# Patient Record
Sex: FEMALE | Race: ASIAN | NOT HISPANIC OR LATINO | Employment: OTHER | URBAN - METROPOLITAN AREA
[De-identification: names, ages, dates, MRNs, and addresses within clinical notes are randomized per-mention and may not be internally consistent; named-entity substitution may affect disease eponyms.]

---

## 2017-02-07 ENCOUNTER — COMMUNICATION - HEALTHEAST (OUTPATIENT)
Dept: FAMILY MEDICINE | Facility: CLINIC | Age: 32
End: 2017-02-07

## 2017-02-27 ENCOUNTER — OFFICE VISIT - HEALTHEAST (OUTPATIENT)
Dept: FAMILY MEDICINE | Facility: CLINIC | Age: 32
End: 2017-02-27

## 2017-02-27 DIAGNOSIS — N91.2 AMENORRHEA: ICD-10-CM

## 2017-02-27 DIAGNOSIS — Z3A.09 9 WEEKS GESTATION OF PREGNANCY: ICD-10-CM

## 2017-02-27 ASSESSMENT — MIFFLIN-ST. JEOR: SCORE: 1076.63

## 2017-03-20 ENCOUNTER — PRENATAL OFFICE VISIT - HEALTHEAST (OUTPATIENT)
Dept: FAMILY MEDICINE | Facility: CLINIC | Age: 32
End: 2017-03-20

## 2017-03-20 DIAGNOSIS — Z34.81 ENCOUNTER FOR SUPERVISION OF OTHER NORMAL PREGNANCY IN FIRST TRIMESTER: ICD-10-CM

## 2017-03-20 LAB — HIV 1+2 AB+HIV1 P24 AG SERPL QL IA: NEGATIVE

## 2017-03-20 ASSESSMENT — MIFFLIN-ST. JEOR: SCORE: 1068.01

## 2017-03-21 ENCOUNTER — COMMUNICATION - HEALTHEAST (OUTPATIENT)
Dept: FAMILY MEDICINE | Facility: CLINIC | Age: 32
End: 2017-03-21

## 2017-03-21 LAB
HBV SURFACE AG SERPL QL IA: NEGATIVE
SYPHILIS RPR SCREEN - HISTORICAL: NORMAL

## 2017-04-24 ENCOUNTER — PRENATAL OFFICE VISIT - HEALTHEAST (OUTPATIENT)
Dept: FAMILY MEDICINE | Facility: CLINIC | Age: 32
End: 2017-04-24

## 2017-04-24 DIAGNOSIS — Z34.82 ENCOUNTER FOR SUPERVISION OF OTHER NORMAL PREGNANCY IN SECOND TRIMESTER: ICD-10-CM

## 2017-04-24 ASSESSMENT — MIFFLIN-ST. JEOR: SCORE: 1085.41

## 2017-04-25 ENCOUNTER — HOSPITAL ENCOUNTER (OUTPATIENT)
Dept: ULTRASOUND IMAGING | Facility: HOSPITAL | Age: 32
Discharge: HOME | End: 2017-04-25
Attending: FAMILY MEDICINE

## 2017-04-25 DIAGNOSIS — Z34.82 ENCOUNTER FOR SUPERVISION OF OTHER NORMAL PREGNANCY IN SECOND TRIMESTER: ICD-10-CM

## 2017-05-02 ENCOUNTER — COMMUNICATION - HEALTHEAST (OUTPATIENT)
Dept: ADMINISTRATIVE | Facility: CLINIC | Age: 32
End: 2017-05-02

## 2017-05-22 ENCOUNTER — PRENATAL OFFICE VISIT - HEALTHEAST (OUTPATIENT)
Dept: FAMILY MEDICINE | Facility: CLINIC | Age: 32
End: 2017-05-22

## 2017-05-22 DIAGNOSIS — Z34.82 ENCOUNTER FOR SUPERVISION OF OTHER NORMAL PREGNANCY IN SECOND TRIMESTER: ICD-10-CM

## 2017-05-22 ASSESSMENT — MIFFLIN-ST. JEOR: SCORE: 1105.37

## 2017-05-24 ENCOUNTER — COMMUNICATION - HEALTHEAST (OUTPATIENT)
Dept: ADMINISTRATIVE | Facility: CLINIC | Age: 32
End: 2017-05-24

## 2017-05-26 ENCOUNTER — COMMUNICATION - HEALTHEAST (OUTPATIENT)
Dept: FAMILY MEDICINE | Facility: CLINIC | Age: 32
End: 2017-05-26

## 2017-05-26 ENCOUNTER — HOSPITAL ENCOUNTER (OUTPATIENT)
Dept: ULTRASOUND IMAGING | Facility: HOSPITAL | Age: 32
Discharge: HOME | End: 2017-05-26
Attending: FAMILY MEDICINE

## 2017-05-26 DIAGNOSIS — Z34.82 ENCOUNTER FOR SUPERVISION OF OTHER NORMAL PREGNANCY IN SECOND TRIMESTER: ICD-10-CM

## 2017-06-21 ENCOUNTER — PRENATAL OFFICE VISIT - HEALTHEAST (OUTPATIENT)
Dept: MIDWIFE SERVICES | Facility: CLINIC | Age: 32
End: 2017-06-21

## 2017-06-21 DIAGNOSIS — Z34.82 ENCOUNTER FOR SUPERVISION OF OTHER NORMAL PREGNANCY, SECOND TRIMESTER: ICD-10-CM

## 2017-06-21 ASSESSMENT — MIFFLIN-ST. JEOR: SCORE: 1145.17

## 2017-07-12 ENCOUNTER — PRENATAL OFFICE VISIT - HEALTHEAST (OUTPATIENT)
Dept: MIDWIFE SERVICES | Facility: CLINIC | Age: 32
End: 2017-07-12

## 2017-07-12 DIAGNOSIS — Z34.82 ENCOUNTER FOR SUPERVISION OF OTHER NORMAL PREGNANCY, SECOND TRIMESTER: ICD-10-CM

## 2017-07-12 ASSESSMENT — MIFFLIN-ST. JEOR: SCORE: 1153.34

## 2017-07-13 LAB — SYPHILIS RPR SCREEN - HISTORICAL: NORMAL

## 2017-07-26 ENCOUNTER — PRENATAL OFFICE VISIT - HEALTHEAST (OUTPATIENT)
Dept: MIDWIFE SERVICES | Facility: CLINIC | Age: 32
End: 2017-07-26

## 2017-07-26 DIAGNOSIS — Z34.83 ENCOUNTER FOR SUPERVISION OF OTHER NORMAL PREGNANCY, THIRD TRIMESTER: ICD-10-CM

## 2017-07-26 ASSESSMENT — MIFFLIN-ST. JEOR: SCORE: 1163.32

## 2017-08-16 ENCOUNTER — PRENATAL OFFICE VISIT - HEALTHEAST (OUTPATIENT)
Dept: MIDWIFE SERVICES | Facility: CLINIC | Age: 32
End: 2017-08-16

## 2017-08-16 DIAGNOSIS — Z34.83 ENCOUNTER FOR SUPERVISION OF OTHER NORMAL PREGNANCY, THIRD TRIMESTER: ICD-10-CM

## 2017-08-16 ASSESSMENT — MIFFLIN-ST. JEOR: SCORE: 1177.38

## 2017-09-06 ENCOUNTER — PRENATAL OFFICE VISIT - HEALTHEAST (OUTPATIENT)
Dept: FAMILY MEDICINE | Facility: CLINIC | Age: 32
End: 2017-09-06

## 2017-09-06 DIAGNOSIS — Z34.90 PREGNANCY: ICD-10-CM

## 2017-09-06 DIAGNOSIS — Z34.83 ENCOUNTER FOR SUPERVISION OF OTHER NORMAL PREGNANCY IN THIRD TRIMESTER: ICD-10-CM

## 2017-09-06 ASSESSMENT — MIFFLIN-ST. JEOR: SCORE: 1199.6

## 2017-09-15 ENCOUNTER — PRENATAL OFFICE VISIT - HEALTHEAST (OUTPATIENT)
Dept: FAMILY MEDICINE | Facility: CLINIC | Age: 32
End: 2017-09-15

## 2017-09-15 DIAGNOSIS — Z34.90 PREGNANCY: ICD-10-CM

## 2017-09-15 DIAGNOSIS — Z34.83 ENCOUNTER FOR SUPERVISION OF OTHER NORMAL PREGNANCY IN THIRD TRIMESTER: ICD-10-CM

## 2017-09-15 ASSESSMENT — MIFFLIN-ST. JEOR: SCORE: 1203.35

## 2017-09-22 ENCOUNTER — PRENATAL OFFICE VISIT - HEALTHEAST (OUTPATIENT)
Dept: FAMILY MEDICINE | Facility: CLINIC | Age: 32
End: 2017-09-22

## 2017-09-22 DIAGNOSIS — Z34.90 PREGNANCY: ICD-10-CM

## 2017-09-22 DIAGNOSIS — Z34.83 ENCOUNTER FOR SUPERVISION OF OTHER NORMAL PREGNANCY IN THIRD TRIMESTER: ICD-10-CM

## 2017-09-22 ASSESSMENT — MIFFLIN-ST. JEOR: SCORE: 1208.79

## 2017-09-29 ENCOUNTER — PRENATAL OFFICE VISIT - HEALTHEAST (OUTPATIENT)
Dept: FAMILY MEDICINE | Facility: CLINIC | Age: 32
End: 2017-09-29

## 2017-09-29 DIAGNOSIS — Z34.83 ENCOUNTER FOR SUPERVISION OF OTHER NORMAL PREGNANCY IN THIRD TRIMESTER: ICD-10-CM

## 2017-09-29 DIAGNOSIS — Z34.90 PREGNANCY: ICD-10-CM

## 2017-09-29 ASSESSMENT — MIFFLIN-ST. JEOR: SCORE: 1211.51

## 2017-12-11 ENCOUNTER — OFFICE VISIT - HEALTHEAST (OUTPATIENT)
Dept: FAMILY MEDICINE | Facility: CLINIC | Age: 32
End: 2017-12-11

## 2017-12-11 DIAGNOSIS — Z30.011 ENCOUNTER FOR INITIAL PRESCRIPTION OF CONTRACEPTIVE PILLS: ICD-10-CM

## 2017-12-11 ASSESSMENT — MIFFLIN-ST. JEOR: SCORE: 1103.1

## 2018-10-11 ENCOUNTER — COMMUNICATION - HEALTHEAST (OUTPATIENT)
Dept: FAMILY MEDICINE | Facility: CLINIC | Age: 33
End: 2018-10-11

## 2018-10-31 ENCOUNTER — OFFICE VISIT - HEALTHEAST (OUTPATIENT)
Dept: FAMILY MEDICINE | Facility: CLINIC | Age: 33
End: 2018-10-31

## 2018-10-31 ENCOUNTER — HOSPITAL ENCOUNTER (OUTPATIENT)
Dept: ULTRASOUND IMAGING | Facility: HOSPITAL | Age: 33
Discharge: HOME | End: 2018-10-31
Attending: FAMILY MEDICINE

## 2018-10-31 DIAGNOSIS — Z3A.10 10 WEEKS GESTATION OF PREGNANCY: ICD-10-CM

## 2018-10-31 DIAGNOSIS — Z23 NEED FOR VACCINATION: ICD-10-CM

## 2018-10-31 LAB — HCG UR QL: POSITIVE

## 2018-10-31 ASSESSMENT — MIFFLIN-ST. JEOR: SCORE: 1101.74

## 2018-11-01 ENCOUNTER — COMMUNICATION - HEALTHEAST (OUTPATIENT)
Dept: FAMILY MEDICINE | Facility: CLINIC | Age: 33
End: 2018-11-01

## 2018-11-07 ENCOUNTER — COMMUNICATION - HEALTHEAST (OUTPATIENT)
Dept: FAMILY MEDICINE | Facility: CLINIC | Age: 33
End: 2018-11-07

## 2018-11-16 ENCOUNTER — PRENATAL OFFICE VISIT - HEALTHEAST (OUTPATIENT)
Dept: FAMILY MEDICINE | Facility: CLINIC | Age: 33
End: 2018-11-16

## 2018-11-16 DIAGNOSIS — Z34.81 ENCOUNTER FOR SUPERVISION OF OTHER NORMAL PREGNANCY IN FIRST TRIMESTER: ICD-10-CM

## 2018-11-16 LAB
ALBUMIN UR-MCNC: NEGATIVE MG/DL
APPEARANCE UR: CLEAR
BASOPHILS # BLD AUTO: 0 THOU/UL (ref 0–0.2)
BASOPHILS NFR BLD AUTO: 0 % (ref 0–2)
BILIRUB UR QL STRIP: NEGATIVE
COLOR UR AUTO: YELLOW
EOSINOPHIL # BLD AUTO: 0.2 THOU/UL (ref 0–0.4)
EOSINOPHIL NFR BLD AUTO: 2 % (ref 0–6)
ERYTHROCYTE [DISTWIDTH] IN BLOOD BY AUTOMATED COUNT: 12.5 % (ref 11–14.5)
GLUCOSE UR STRIP-MCNC: NEGATIVE MG/DL
HCT VFR BLD AUTO: 39.1 % (ref 35–47)
HGB BLD-MCNC: 12.9 G/DL (ref 12–16)
HGB UR QL STRIP: NEGATIVE
HIV 1+2 AB+HIV1 P24 AG SERPL QL IA: NEGATIVE
KETONES UR STRIP-MCNC: NEGATIVE MG/DL
LEUKOCYTE ESTERASE UR QL STRIP: ABNORMAL
LYMPHOCYTES # BLD AUTO: 2.3 THOU/UL (ref 0.8–4.4)
LYMPHOCYTES NFR BLD AUTO: 27 % (ref 20–40)
MCH RBC QN AUTO: 30.9 PG (ref 27–34)
MCHC RBC AUTO-ENTMCNC: 33 G/DL (ref 32–36)
MCV RBC AUTO: 94 FL (ref 80–100)
MONOCYTES # BLD AUTO: 0.6 THOU/UL (ref 0–0.9)
MONOCYTES NFR BLD AUTO: 7 % (ref 2–10)
NEUTROPHILS # BLD AUTO: 5.6 THOU/UL (ref 2–7.7)
NEUTROPHILS NFR BLD AUTO: 64 % (ref 50–70)
NITRATE UR QL: NEGATIVE
PH UR STRIP: 7.5 [PH] (ref 5–8)
PLATELET # BLD AUTO: 237 THOU/UL (ref 140–440)
PMV BLD AUTO: 9.5 FL (ref 8.5–12.5)
RBC # BLD AUTO: 4.17 MILL/UL (ref 3.8–5.4)
SP GR UR STRIP: 1.01 (ref 1–1.03)
UROBILINOGEN UR STRIP-ACNC: ABNORMAL
WBC: 8.8 THOU/UL (ref 4–11)

## 2018-11-16 ASSESSMENT — MIFFLIN-ST. JEOR: SCORE: 1103.55

## 2018-11-17 LAB
ANTIBODY SCREEN: NEGATIVE
BACTERIA SPEC CULT: NO GROWTH
HBV SURFACE AG SERPL QL IA: NEGATIVE
T PALLIDUM AB SER QL: NEGATIVE

## 2018-11-19 LAB
ABO/RH(D): NORMAL
ABORH REPEAT: NORMAL
C TRACH DNA SPEC QL PROBE+SIG AMP: NEGATIVE
N GONORRHOEA DNA SPEC QL NAA+PROBE: NEGATIVE
RUBV IGG SERPL QL IA: POSITIVE

## 2018-11-20 ENCOUNTER — COMMUNICATION - HEALTHEAST (OUTPATIENT)
Dept: FAMILY MEDICINE | Facility: CLINIC | Age: 33
End: 2018-11-20

## 2018-12-17 ENCOUNTER — PRENATAL OFFICE VISIT - HEALTHEAST (OUTPATIENT)
Dept: FAMILY MEDICINE | Facility: CLINIC | Age: 33
End: 2018-12-17

## 2018-12-17 DIAGNOSIS — Z34.82 ENCOUNTER FOR SUPERVISION OF OTHER NORMAL PREGNANCY IN SECOND TRIMESTER: ICD-10-CM

## 2018-12-17 ASSESSMENT — MIFFLIN-ST. JEOR: SCORE: 1096.3

## 2019-01-11 ENCOUNTER — HOSPITAL ENCOUNTER (OUTPATIENT)
Dept: ULTRASOUND IMAGING | Facility: HOSPITAL | Age: 34
Discharge: HOME | End: 2019-01-11
Attending: FAMILY MEDICINE

## 2019-01-11 DIAGNOSIS — Z34.82 ENCOUNTER FOR SUPERVISION OF OTHER NORMAL PREGNANCY IN SECOND TRIMESTER: ICD-10-CM

## 2019-01-14 ENCOUNTER — COMMUNICATION - HEALTHEAST (OUTPATIENT)
Dept: FAMILY MEDICINE | Facility: CLINIC | Age: 34
End: 2019-01-14

## 2019-01-18 ENCOUNTER — PRENATAL OFFICE VISIT - HEALTHEAST (OUTPATIENT)
Dept: FAMILY MEDICINE | Facility: CLINIC | Age: 34
End: 2019-01-18

## 2019-01-18 DIAGNOSIS — Z34.82 ENCOUNTER FOR SUPERVISION OF OTHER NORMAL PREGNANCY IN SECOND TRIMESTER: ICD-10-CM

## 2019-01-18 ASSESSMENT — MIFFLIN-ST. JEOR: SCORE: 1120.79

## 2019-02-15 ENCOUNTER — PRENATAL OFFICE VISIT - HEALTHEAST (OUTPATIENT)
Dept: FAMILY MEDICINE | Facility: CLINIC | Age: 34
End: 2019-02-15

## 2019-02-15 DIAGNOSIS — Z34.82 ENCOUNTER FOR SUPERVISION OF OTHER NORMAL PREGNANCY IN SECOND TRIMESTER: ICD-10-CM

## 2019-02-15 LAB
FASTING STATUS PATIENT QL REPORTED: YES
GLUCOSE 1H P 50 G GLC PO SERPL-MCNC: 93 MG/DL (ref 70–139)
HGB BLD-MCNC: 11.8 G/DL (ref 12–16)

## 2019-02-15 ASSESSMENT — MIFFLIN-ST. JEOR: SCORE: 1141.66

## 2019-02-16 LAB — T PALLIDUM AB SER QL: NEGATIVE

## 2019-03-29 ENCOUNTER — PRENATAL OFFICE VISIT - HEALTHEAST (OUTPATIENT)
Dept: FAMILY MEDICINE | Facility: CLINIC | Age: 34
End: 2019-03-29

## 2019-03-29 DIAGNOSIS — Z34.83 ENCOUNTER FOR SUPERVISION OF OTHER NORMAL PREGNANCY IN THIRD TRIMESTER: ICD-10-CM

## 2019-03-29 ASSESSMENT — MIFFLIN-ST. JEOR: SCORE: 1166.15

## 2019-04-12 ENCOUNTER — PRENATAL OFFICE VISIT - HEALTHEAST (OUTPATIENT)
Dept: FAMILY MEDICINE | Facility: CLINIC | Age: 34
End: 2019-04-12

## 2019-04-12 DIAGNOSIS — Z34.83 ENCOUNTER FOR SUPERVISION OF OTHER NORMAL PREGNANCY IN THIRD TRIMESTER: ICD-10-CM

## 2019-04-12 ASSESSMENT — MIFFLIN-ST. JEOR: SCORE: 1175.68

## 2019-04-26 ENCOUNTER — PRENATAL OFFICE VISIT - HEALTHEAST (OUTPATIENT)
Dept: FAMILY MEDICINE | Facility: CLINIC | Age: 34
End: 2019-04-26

## 2019-04-26 DIAGNOSIS — Z34.83 ENCOUNTER FOR SUPERVISION OF OTHER NORMAL PREGNANCY IN THIRD TRIMESTER: ICD-10-CM

## 2019-04-26 ASSESSMENT — MIFFLIN-ST. JEOR: SCORE: 1180.21

## 2019-05-03 ENCOUNTER — PRENATAL OFFICE VISIT - HEALTHEAST (OUTPATIENT)
Dept: FAMILY MEDICINE | Facility: CLINIC | Age: 34
End: 2019-05-03

## 2019-05-03 DIAGNOSIS — Z34.90 PREGNANCY: ICD-10-CM

## 2019-05-03 DIAGNOSIS — Z34.83 ENCOUNTER FOR SUPERVISION OF OTHER NORMAL PREGNANCY IN THIRD TRIMESTER: ICD-10-CM

## 2019-05-03 DIAGNOSIS — O12.13 PROTEINURIA AFFECTING PREGNANCY IN THIRD TRIMESTER: ICD-10-CM

## 2019-05-03 LAB
ALBUMIN UR-MCNC: ABNORMAL MG/DL
ALT SERPL W P-5'-P-CCNC: 11 U/L (ref 0–45)
AST SERPL W P-5'-P-CCNC: 14 U/L (ref 0–40)
CREAT SERPL-MCNC: 0.56 MG/DL (ref 0.6–1.1)
CREAT UR-MCNC: 117.2 MG/DL
ERYTHROCYTE [DISTWIDTH] IN BLOOD BY AUTOMATED COUNT: 11.5 % (ref 11–14.5)
GFR SERPL CREATININE-BSD FRML MDRD: >60 ML/MIN/1.73M2
GLUCOSE UR STRIP-MCNC: NEGATIVE MG/DL
HCT VFR BLD AUTO: 39.7 % (ref 35–47)
HGB BLD-MCNC: 13 G/DL (ref 12–16)
KETONES UR STRIP-MCNC: ABNORMAL MG/DL
MCH RBC QN AUTO: 30.6 PG (ref 27–34)
MCHC RBC AUTO-ENTMCNC: 32.9 G/DL (ref 32–36)
MCV RBC AUTO: 93 FL (ref 80–100)
PLATELET # BLD AUTO: 172 THOU/UL (ref 140–440)
PMV BLD AUTO: 7.4 FL (ref 7–10)
PROTEIN, RANDOM URINE - HISTORICAL: 18 MG/DL
PROTEIN/CREAT RATIO, RANDOM UR: 0.15
RBC # BLD AUTO: 4.26 MILL/UL (ref 3.8–5.4)
URATE SERPL-MCNC: 4.6 MG/DL (ref 2–7.5)
WBC: 7.6 THOU/UL (ref 4–11)

## 2019-05-03 ASSESSMENT — MIFFLIN-ST. JEOR: SCORE: 1187.47

## 2019-05-05 LAB
ALLERGIC TO PENICILLIN: NO
GP B STREP DNA SPEC QL NAA+PROBE: NEGATIVE

## 2019-05-06 ENCOUNTER — COMMUNICATION - HEALTHEAST (OUTPATIENT)
Dept: FAMILY MEDICINE | Facility: CLINIC | Age: 34
End: 2019-05-06

## 2019-05-10 ENCOUNTER — PRENATAL OFFICE VISIT - HEALTHEAST (OUTPATIENT)
Dept: FAMILY MEDICINE | Facility: CLINIC | Age: 34
End: 2019-05-10

## 2019-05-10 DIAGNOSIS — O26.893 VAGINAL DISCHARGE DURING PREGNANCY IN THIRD TRIMESTER: ICD-10-CM

## 2019-05-10 DIAGNOSIS — Z34.83 ENCOUNTER FOR SUPERVISION OF OTHER NORMAL PREGNANCY IN THIRD TRIMESTER: ICD-10-CM

## 2019-05-10 DIAGNOSIS — N89.8 VAGINAL DISCHARGE DURING PREGNANCY IN THIRD TRIMESTER: ICD-10-CM

## 2019-05-10 DIAGNOSIS — Z34.90 PREGNANCY: ICD-10-CM

## 2019-05-10 LAB
ALBUMIN UR-MCNC: NEGATIVE MG/DL
CLUE CELLS: NORMAL
GLUCOSE UR STRIP-MCNC: NEGATIVE MG/DL
KETONES UR STRIP-MCNC: NEGATIVE MG/DL
TRICHOMONAS, WET PREP: NORMAL
YEAST, WET PREP: NORMAL

## 2019-05-10 ASSESSMENT — MIFFLIN-ST. JEOR: SCORE: 1191.55

## 2019-05-14 ENCOUNTER — HOME CARE/HOSPICE - HEALTHEAST (OUTPATIENT)
Dept: HOME HEALTH SERVICES | Facility: HOME HEALTH | Age: 34
End: 2019-05-14

## 2019-07-22 ENCOUNTER — OFFICE VISIT - HEALTHEAST (OUTPATIENT)
Dept: FAMILY MEDICINE | Facility: CLINIC | Age: 34
End: 2019-07-22

## 2019-07-22 LAB — HGB BLD-MCNC: 13.3 G/DL (ref 12–16)

## 2019-07-23 LAB
HPV SOURCE: NORMAL
HUMAN PAPILLOMA VIRUS 16 DNA: NEGATIVE
HUMAN PAPILLOMA VIRUS 18 DNA: NEGATIVE
HUMAN PAPILLOMA VIRUS FINAL DIAGNOSIS: NORMAL
HUMAN PAPILLOMA VIRUS OTHER HR: NEGATIVE
SPECIMEN DESCRIPTION: NORMAL

## 2019-07-28 ENCOUNTER — COMMUNICATION - HEALTHEAST (OUTPATIENT)
Dept: FAMILY MEDICINE | Facility: CLINIC | Age: 34
End: 2019-07-28

## 2019-07-29 LAB
BKR LAB AP ABNORMAL BLEEDING: NO
BKR LAB AP BIRTH CONTROL/HORMONES: NORMAL
BKR LAB AP CERVICAL APPEARANCE: NORMAL
BKR LAB AP GYN ADEQUACY: NORMAL
BKR LAB AP GYN INTERPRETATION: NORMAL
BKR LAB AP HPV REFLEX: NORMAL
BKR LAB AP LMP: NORMAL
BKR LAB AP PATIENT STATUS: NORMAL
BKR LAB AP PREVIOUS ABNORMAL: NO
BKR LAB AP PREVIOUS NORMAL: 2018
HIGH RISK?: NO
PATH REPORT.COMMENTS IMP SPEC: NORMAL
RESULT FLAG (HE HISTORICAL CONVERSION): NORMAL

## 2020-06-21 ENCOUNTER — COMMUNICATION - HEALTHEAST (OUTPATIENT)
Dept: FAMILY MEDICINE | Facility: CLINIC | Age: 35
End: 2020-06-21

## 2020-10-10 ENCOUNTER — COMMUNICATION - HEALTHEAST (OUTPATIENT)
Dept: FAMILY MEDICINE | Facility: CLINIC | Age: 35
End: 2020-10-10

## 2020-12-21 ENCOUNTER — COMMUNICATION - HEALTHEAST (OUTPATIENT)
Dept: FAMILY MEDICINE | Facility: CLINIC | Age: 35
End: 2020-12-21

## 2020-12-30 ENCOUNTER — COMMUNICATION - HEALTHEAST (OUTPATIENT)
Dept: FAMILY MEDICINE | Facility: CLINIC | Age: 35
End: 2020-12-30

## 2021-01-12 ENCOUNTER — OFFICE VISIT - HEALTHEAST (OUTPATIENT)
Dept: FAMILY MEDICINE | Facility: CLINIC | Age: 36
End: 2021-01-12

## 2021-01-12 DIAGNOSIS — Z30.41 ENCOUNTER FOR SURVEILLANCE OF CONTRACEPTIVE PILLS: ICD-10-CM

## 2021-01-12 RX ORDER — NORGESTIMATE AND ETHINYL ESTRADIOL 7DAYSX3 LO
1 KIT ORAL DAILY
Qty: 84 TABLET | Refills: 3 | Status: SHIPPED | OUTPATIENT
Start: 2021-01-12 | End: 2022-02-14

## 2021-01-12 ASSESSMENT — MIFFLIN-ST. JEOR: SCORE: 1102.19

## 2021-05-27 NOTE — PROGRESS NOTES
Patient reports she continues to feel well.  She denies any leakage of fluid or bleeding, has been feeling good fetal movement.  We discussed kick counting today.  Tdap vaccine given.  [place] paperwork filled [place] today.  Recommended follow-up in 2 weeks with discussion of delivery preferences next visit.

## 2021-05-27 NOTE — PATIENT INSTRUCTIONS - HE
Patient Education   HEALTHY PREGNANCY CARE: 30-34 WEEKS PREGNANT    You have made it to the final months of your pregnancy. By now, your baby is starting to fill out with some fat under his skin, fuzzy hair on his shoulders, and is gaining 4 to 6 ounces per week.    Discuss any travel plans with your midwife or physician.    Review possible changes in sexuality during later pregnancy and discuss these with your midwife or physician, as well as your partner. Alternative love-making positions may be more comfortable.    Discuss labor and delivery issues with your midwife or physician. If you had a  birth in the past, discuss a trial of labor with your midwife or physician. He or she may ask that you sign a consent form, if you wish to have a vaginal birth after  (). Ask your midwife or physician to explain your options for managing pain during your labor and delivery. Sometimes, during the birth process, an episiotomy may need to be cut in the vagina to make the opening bigger or let the baby come out quicker. You may want to discuss the episiotomy and how often it is needed with your midwife or physician.    Plan for your baby's care by selecting a child health care provider (Family physician, Pediatrician, or Pediatric Nurse Practitioner). Practice installing an infant car seat correctly in the car. Ask for car seat information as needed and make sure it is safe and will work in the car your baby will ride in. You will need a car seat to bring your baby home from the hospital. Check the procedure for adding your baby to your health care plan. Review your decision about circumcision and ask for any information you need. As you buy and receive items for your baby, don't put a baby walker on your list. Walkers can be dangerous and can cause serious injury to your child. A safer option is a saucer-type play station, since it doesn't allow baby to travel across the floor.    Discuss your choices and  plans for birth control with your midwife or physician. Women who are breastfeeding can still become pregnant. Use a birth control method if you want to lower your pregnancy risk. Talk to your midwife or physician if you are considering permanent birth control, such as tubal ligation or Essure. You may need to complete a consent form 30 days prior to delivery in order to have this done after you deliver.    Continue to watch for signs and symptoms of preeclampsia:     Sudden swelling of your face, hands, or feet     New vision problems such as blurring, double vision, or flashing lights    A severe headache not relieved with acetaminophen (Tylenol)    Sharp or stabbing pain in your right or middle upper abdomen    Watch for signs and symptoms of premature labor:     Regular contractions. This means having about 6 or more within 1 hour, even after you have had a glass of water and are resting.     A backache that starts and stops regularly.    An increase or change in vaginal discharge, such as heavy, mucus-like, watery, or bloody discharge.     Your water breaks or leaks.    If you have any of the above symptoms or any other concerns, call your provider or their clinic staff at [facility] at Phone: [phone]. If it's after clinic hours, physician patients should call the [facility] at [phone] ([phone]); midwife patients should call their answering service at [phone].    How can you care for yourself at home?   You can refer to the Starting Out Right book or find it online at [email or web address] or [email or web address]/[email or web address]-starting-out-[email or web address]starting-out-right.html#p=8     You can sign up for a weekly parenting e-mail that gives support, tips and advice from health care professionals that starts with pregnancy and continues through the toddler years. To register, go to  [email or web address] at any time during your pregnancy.

## 2021-05-27 NOTE — PATIENT INSTRUCTIONS - HE
Patient Education   HEALTHY PREGNANCY CARE: 30-34 WEEKS PREGNANT    You have made it to the final months of your pregnancy. By now, your baby is starting to fill out with some fat under his skin, fuzzy hair on his shoulders, and is gaining 4 to 6 ounces per week.    Discuss any travel plans with your midwife or physician.    Review possible changes in sexuality during later pregnancy and discuss these with your midwife or physician, as well as your partner. Alternative love-making positions may be more comfortable.    Discuss labor and delivery issues with your midwife or physician. If you had a  birth in the past, discuss a trial of labor with your midwife or physician. He or she may ask that you sign a consent form, if you wish to have a vaginal birth after  (). Ask your midwife or physician to explain your options for managing pain during your labor and delivery. Sometimes, during the birth process, an episiotomy may need to be cut in the vagina to make the opening bigger or let the baby come out quicker. You may want to discuss the episiotomy and how often it is needed with your midwife or physician.    Plan for your baby's care by selecting a child health care provider (Family physician, Pediatrician, or Pediatric Nurse Practitioner). Practice installing an infant car seat correctly in the car. Ask for car seat information as needed and make sure it is safe and will work in the car your baby will ride in. You will need a car seat to bring your baby home from the hospital. Check the procedure for adding your baby to your health care plan. Review your decision about circumcision and ask for any information you need. As you buy and receive items for your baby, don't put a baby walker on your list. Walkers can be dangerous and can cause serious injury to your child. A safer option is a saucer-type play station, since it doesn't allow baby to travel across the floor.    Discuss your choices and  plans for birth control with your midwife or physician. Women who are breastfeeding can still become pregnant. Use a birth control method if you want to lower your pregnancy risk. Talk to your midwife or physician if you are considering permanent birth control, such as tubal ligation or Essure. You may need to complete a consent form 30 days prior to delivery in order to have this done after you deliver.    Continue to watch for signs and symptoms of preeclampsia:     Sudden swelling of your face, hands, or feet     New vision problems such as blurring, double vision, or flashing lights    A severe headache not relieved with acetaminophen (Tylenol)    Sharp or stabbing pain in your right or middle upper abdomen    Watch for signs and symptoms of premature labor:     Regular contractions. This means having about 6 or more within 1 hour, even after you have had a glass of water and are resting.     A backache that starts and stops regularly.    An increase or change in vaginal discharge, such as heavy, mucus-like, watery, or bloody discharge.     Your water breaks or leaks.    If you have any of the above symptoms or any other concerns, call your provider or their clinic staff at [facility] at Phone: [phone]. If it's after clinic hours, physician patients should call the [facility] Connection at [phone] ([phone]); midwife patients should call their answering service at [phone].    How can you care for yourself at home?   You can refer to the Starting Out Right book or find it online at [email or web address]/[email or web address]/[email or web address]-[email or web address]-[email or web address] or [email or web address].[email or web address]/images/stories/flipbooks/[email or web address]-starting-out-right/[facility]-starting-out-right.html#p=8     You can sign up for a weekly parenting e-mail that gives support, tips and advice from health care professionals that starts with pregnancy and continues through the toddler years. To register, go to  [email or web address] at any time during your pregnancy.

## 2021-05-27 NOTE — PROGRESS NOTES
Patient continues to feel well.  She denies leakage of fluid or bleeding, has no lower extremity edema, no contractions.  Reports good fetal movement.  Discussed delivery preferences today.  Patient plans to have baby follow up here after delivery, plans OCP for contraception.  Plan follow-up in 2 weeks.

## 2021-05-28 NOTE — PROGRESS NOTES
Patient feels well from a pregnancy perspective.  She reports good fetal movement, denies contractions or lower extremity edema.  She has had a small amount of vaginal discharge that she describes as white along with some vaginal itching over the past 3 days or so.  Wet prep today is negative, no obvious pooling concerning for amniotic fluid leakage is observed on today's pelvic exam.  She denies any gushes of fluid.  She refuses digital vaginal exam today.  Plan follow-up in 1 week if remains undelivered.

## 2021-05-28 NOTE — PROGRESS NOTES
Patient reports she feels well.  She describes what she thinks were contractions yesterday, although this sounded a bit more prolonged than contractions, possibly Andre Hendrickson.  She denies any leakage of fluid or bleeding.  She did have some protein in her urine today, HELLP labs obtained and normal.  No headaches, no blurred vision, no lower extremity edema.  Group B strep obtained today along with recheck hemoglobin.  Plan follow-up in 1 week.

## 2021-05-28 NOTE — PATIENT INSTRUCTIONS - HE
Patient Education   HEALTHY PREGNANCY CARE: 34-36 WEEKS PREGNANT    Your baby is gaining about an ounce each day, so healthy nutrition and rest are still very important. Learn about late pregnancy symptoms, such as constipation and backaches. Drinking more fluids and eating more fiber can relieve constipation. The pelvic tilt and a heating pad can ease backaches.    Continue to watch for signs and symptoms of preeclampsia:     Sudden swelling of your face, hands, or feet     New vision problems such as blurring, double vision, or flashing lights    A severe headache not relieved with acetaminophen (Tylenol)    Sharp or stabbing pain in your right or middle upper abdomen    You're almost done with your pregnancy but it's still too soon to have your baby. The goal is to have your baby after 37 weeks, so watch for signs and symptoms of premature labor:     Regular contractions. This means having about 6 or more within 1 hour, even after you have had a glass of water and are resting.     A backache that starts and stops regularly.    An increase or change in vaginal discharge, such as heavy, mucus-like, watery, or bloody discharge.     Your water breaks or leaks.    You will be tested for group B streptococcus (GBS), a type of bacteria found in 10-30% of pregnant women. A woman with GBS can pass it to her baby during delivery. Most babies who get GBS from their mothers do not have any problems, but some babies get very ill and need to be hospitalized for antibiotic treatment. Treating you with antibiotics during labor and delivery helps to prevent infection in your baby.    Review information on postpartum care that you will need after the baby is born.  Discuss your choices and plans for birth control with your midwife or physician.     Postpartum Care  During the days and weeks after the delivery of your baby (postpartum period), your body will change as it returns to its nonpregnant condition. As with pregnancy  "changes, postpartum changes are different for every woman.    Physical changes after childbirth  The changes in your body may include:    Contractions called afterpains shrink the uterus for several days after childbirth. Shrinking of the uterus to its prepregnancy size may take 6 to 8 weeks.    Sore muscles (especially in the arms, neck, or jaw) are common after childbirth. This is because of the hard work of labor and pushing your baby out. The soreness should go away in a few days.    Bleeding and vaginal discharge (lochia) may last for 2 to 8 weeks, and can come and go for about 2 months.    Vaginal soreness, including pain, discomfort, and numbness, is common after vaginal birth. Soreness may be worse if you had a perineal tear or episiotomy.    If you had a  birth (), you may have pain in your lower abdomen and may need pain medicine for 1 to 2 weeks.    Breast engorgement is common around the 3rd or 4th day after delivery, when the breasts begin to fill with milk. This can cause discomfort and swelling. If you are breast feeding, the best treatment is to feed your baby often or pump the milk out. You can also use warm compresses. If you are not breast feeding, placing ice packs on your breasts, taking a hot shower, or using warm compresses may relieve the discomfort.    Be aware of postpartum depression    \"Baby blues\" are common for the first 1 to 2 weeks after birth. You may cry or feel sad or irritable for no reason.     For some women, these feelings last longer and are more intense. This is called postpartum depression.     If your symptoms last for more than a few weeks or you feel very depressed, ask your midwife or physician for help.     Postpartum depression can be treated. Support groups and counseling can help, but sometimes medication is needed.     Discuss follow-up appointments with your midwife or physician, as well. He or she will usually want to see you 6 weeks after the " birth of your baby, sooner if you are having problems.    If you have questions about any symptoms you are experiencing or any other concerns, call your provider or their clinic staff at [facility] at Phone: [phone]. If it's after clinic hours, physician patients should call the Care Connection at [phone] ([phone]); midwife patients should call their answering service at [phone].    How can you care for yourself at home?   You can refer to the Starting Out Right book or find it online at [email or web address]/[email or web address]/http://[email or web address]/[email or web address]/[email or web address]-[email or web address]-Right.pdf or http://www.[email or web address]/images/stories/flipbooks/[facility]-starting-out-right/[facility]-starting-out-right.html#p=8     You can sign up for a weekly parenting e-mail that gives support, tips and advice from health care professionals that starts with pregnancy and continues through the toddler years. To register, go to [email or web address] at any time during your pregnancy.        Making Plans for Feeding My Baby    By this point, you probably have read a lot about feeding your baby.  Breastfeed or formula? Each mother s decision is her own and [facility] respects you and your choices. We ve gathered information on both breastfeeding and formula feeding to help with your decision. Talking with your physician or nurse-midwife can also help in your decision.  However you plan to feed your baby, [facility] encourage rooming in with your baby, skin-to-skin contact and feeding your baby based on his or her cues.    Skin-to-skin contact  Being close to mom helps your baby adjust to life outside of the womb.  It helps your baby regulate their body temperature, heart rate, and breathing.  Your baby will usually be placed skin-to-skin immediately following birth or as soon as possible, if medical intervention is needed.    Rooming-In  Having your baby  stay with you in your room is called  rooming-in .  Keeping your baby in your room helps you to learn how to care for your baby by getting to know your baby s cues, body rhythms and sleep cycle.       Cue-based feeding  Cues (signals) are baby s way of telling you what he or she wants.  When you learn your infant s cues, you know how to care for and feed your baby.   Feeding cues are the licking and smacking of lips, bringing their fist to their mouth, and a reflex called  rooting - where baby turns and opens his or her mouth, searching for the breast or bottle.  Crying is a late feeding cue.  Babies can feed frequently, often at least 8 times in 24 hours.  Breastfeeding facts  Breast milk is the best source of nutrition for your baby and is available at birth. In the first couple of days, your milk volume is already starting to increase, though it may not be noticeable. Breastfeed frequently to increase your milk supply. Within three to five days, you will begin to notice larger milk volumes. An increase in breast size, heaviness and firmness are often described as the milk  coming in.  Frequent breastfeeding can help breasts from getting overly firm and painful. You will know the baby is getting enough milk if your baby is having wet and dirty diapers and gaining weight.     If your goal is to exclusively breastfeed, it is important to not use any formula or artificial nipples (including bottles and pacifiers) while your baby is learning to breastfeed.  While it may seem like an  easy  option to give your baby a bottle, formula should only be given if there is a medical reason for your baby to have it.    Positioning and attachment   Get comfortable.  Use pillows as needed to support your arms and baby.  Hold baby close at the level of your breast, facing you in a tummy to tummy position.  Skin to skin helps with this.  Position the baby with his or her nose by the nipple.  There should be a straight line from  baby s ear to shoulder to hips.  Tickle your baby s lips or wait for baby to open mouth wide, bring baby to breast by leading with the chin.  Aim the nipple at the roof of baby s mouth.  A rapid sucking pattern is followed by longer, drawing pattern with occasional swallows heard.  When baby is correctly latched, your nipple and much of the areola are pulled well into baby s mouth.      Returning to work or school  Focus on a good start to breastfeeding.  Many women continue to provide breastmilk for their baby when they return to work or school.  Making plans about where to pump and store milk can make the transition go well.  Talk with other mothers who have also returned to work or school for tips and support.  Your employer s Human Resource department may be a resource as well.     Returning to work or school: (continued)     babies can mean fewer  sick  days for you.    A quality breast pump will also save time and add comfort.  Check with your insurance prior to giving birth for breast pump coverage.  Many insurance companies include a pump within your benefits.    Wait until your baby is at least three weeks old to introduce a bottle for the first time.  Have someone besides you give the bottle.    Breastfeed when you are with your baby. Reserve your bottles of breast milk for when you are away.     Your breasts will need to be  emptied  either by your baby or a pump.  Plan to pump at least twice in an eight hour day.    If you cannot pump at work, continue breastfeeding at home. Any amount of breast milk is worth giving to your baby.    Formula feeding facts  If you are planning to use formula to feed your baby, you will want to make some preparations ahead of time. Talk to your doctor or nurse-midwife about what type of formula to use. Some are iron-fortified, meaning they have extra iron in them. You will want to purchase formula and bottles before your baby is born to be sure you are ready after  you return from the hospital. The [facility] do not provide formula samples to take home.    Be sure to follow formula mixing directions closely. Regular milk in the dairy case at the grocery store should not be given to babies under 1 year old. Baby formula is sold in several forms including:    Ready-to-use. This is the most expensive, but no mixing is necessary.    Concentrated liquid. This is less expensive than ready-to-use and you mix with water.    Powder. This is the least expensive. You mix one level scoop of powdered formula with two ounces of water and stir well.    Most babies need 2.5 ounces of formula per pound of body weight each day. This means an 8-pound baby may drink about 20 ounces of formula a day; however, this is just an estimate. The most important thing is to pay attention to your baby s cues.  If your baby is always fussy, needs more iron or has certain food allergies, your physician may suggest you change your baby s formula to a different kind.   How do I warm my baby s bottles?  You may feed your baby a bottle without warming it first. It is OK for the breast milk or formula to be cool or room temperature. If your baby seems to prefer it warmed, you can put the filled bottle in a container of warm water and let it stand for a few minutes. Check the temperature of the liquid on your skin before feeding it to your baby; to be sure it isn t too hot. Do not heat bottles in the microwave. Microwaves heat food and liquids unevenly, and this can cause hot spots that can burn your baby.    How do I clean and sterilize bottles?  Sterilize bottles and nipples before you use them for the first time. You can do this by putting them in boiling water for 5 minutes. After that first time, you can wash them in hot and soapy water. Rinse them carefully to be sure there is no soap left on them. You can also wash them in the .    [facility] [phone] ([phone])

## 2021-05-28 NOTE — TELEPHONE ENCOUNTER
----- Message from Genoveva Louis MD sent at 5/5/2019 11:16 AM CDT -----  Please call patient (needs [facility] ):  Your labs look great, no sign of complications of late pregnancy.  Your group B strep test is negative, no need for antibiotics during labor.  GENE Louis MD

## 2021-05-28 NOTE — PATIENT INSTRUCTIONS - HE
Patient Education   HEALTHY PREGNANCY CARE: 34-36 WEEKS PREGNANT    Your baby is gaining about an ounce each day, so healthy nutrition and rest are still very important. Learn about late pregnancy symptoms, such as constipation and backaches. Drinking more fluids and eating more fiber can relieve constipation. The pelvic tilt and a heating pad can ease backaches.    Continue to watch for signs and symptoms of preeclampsia:     Sudden swelling of your face, hands, or feet     New vision problems such as blurring, double vision, or flashing lights    A severe headache not relieved with acetaminophen (Tylenol)    Sharp or stabbing pain in your right or middle upper abdomen    You're almost done with your pregnancy but it's still too soon to have your baby. The goal is to have your baby after 37 weeks, so watch for signs and symptoms of premature labor:     Regular contractions. This means having about 6 or more within 1 hour, even after you have had a glass of water and are resting.     A backache that starts and stops regularly.    An increase or change in vaginal discharge, such as heavy, mucus-like, watery, or bloody discharge.     Your water breaks or leaks.    You will be tested for group B streptococcus (GBS), a type of bacteria found in 10-30% of pregnant women. A woman with GBS can pass it to her baby during delivery. Most babies who get GBS from their mothers do not have any problems, but some babies get very ill and need to be hospitalized for antibiotic treatment. Treating you with antibiotics during labor and delivery helps to prevent infection in your baby.    Review information on postpartum care that you will need after the baby is born.  Discuss your choices and plans for birth control with your midwife or physician.     Postpartum Care  During the days and weeks after the delivery of your baby (postpartum period), your body will change as it returns to its nonpregnant condition. As with pregnancy  "changes, postpartum changes are different for every woman.    Physical changes after childbirth  The changes in your body may include:    Contractions called afterpains shrink the uterus for several days after childbirth. Shrinking of the uterus to its prepregnancy size may take 6 to 8 weeks.    Sore muscles (especially in the arms, neck, or jaw) are common after childbirth. This is because of the hard work of labor and pushing your baby out. The soreness should go away in a few days.    Bleeding and vaginal discharge (lochia) may last for 2 to 8 weeks, and can come and go for about 2 months.    Vaginal soreness, including pain, discomfort, and numbness, is common after vaginal birth. Soreness may be worse if you had a perineal tear or episiotomy.    If you had a  birth (), you may have pain in your lower abdomen and may need pain medicine for 1 to 2 weeks.    Breast engorgement is common around the 3rd or 4th day after delivery, when the breasts begin to fill with milk. This can cause discomfort and swelling. If you are breast feeding, the best treatment is to feed your baby often or pump the milk out. You can also use warm compresses. If you are not breast feeding, placing ice packs on your breasts, taking a hot shower, or using warm compresses may relieve the discomfort.    Be aware of postpartum depression    \"Baby blues\" are common for the first 1 to 2 weeks after birth. You may cry or feel sad or irritable for no reason.     For some women, these feelings last longer and are more intense. This is called postpartum depression.     If your symptoms last for more than a few weeks or you feel very depressed, ask your midwife or physician for help.     Postpartum depression can be treated. Support groups and counseling can help, but sometimes medication is needed.     Discuss follow-up appointments with your midwife or physician, as well. He or she will usually want to see you 6 weeks after the " birth of your baby, sooner if you are having problems.    If you have questions about any symptoms you are experiencing or any other concerns, call your provider or their clinic staff at [facility] at Phone: [phone]. If it's after clinic hours, physician patients should call the Care Connection at [phone] ([phone]); midwife patients should call their answering service at [phone].    How can you care for yourself at home?   You can refer to the Starting Out Right book or find it online at [email or web address] or [email or web address]/images/[email or web address]/flipbooks/[email or web address]-starting-out-right/[facility]-starting-out-right.html#p=8     You can sign up for a weekly parenting e-mail that gives support, tips and advice from health care professionals that starts with pregnancy and continues through the toddler years. To register, go to [email or web address] at any time during your pregnancy.        Making Plans for Feeding My Baby    By this point, you probably have read a lot about feeding your baby.  Breastfeed or formula? Each mother s decision is her own and [facility] respects you and your choices. We ve gathered information on both breastfeeding and formula feeding to help with your decision. Talking with your physician or nurse-midwife can also help in your decision.  However you plan to feed your baby, [facility] encourage rooming in with your baby, skin-to-skin contact and feeding your baby based on his or her cues.    Skin-to-skin contact  Being close to mom helps your baby adjust to life outside of the womb.  It helps your baby regulate their body temperature, heart rate, and breathing.  Your baby will usually be placed skin-to-skin immediately following birth or as soon as possible, if medical intervention is needed.    Rooming-In  Having your baby  stay with you in your room is called  rooming-in .  Keeping your baby in your room helps you to learn how to care for your baby by getting to know your baby s cues, body rhythms and sleep cycle.       Cue-based feeding  Cues (signals) are baby s way of telling you what he or she wants.  When you learn your infant s cues, you know how to care for and feed your baby.   Feeding cues are the licking and smacking of lips, bringing their fist to their mouth, and a reflex called  rooting - where baby turns and opens his or her mouth, searching for the breast or bottle.  Crying is a late feeding cue.  Babies can feed frequently, often at least 8 times in 24 hours.  Breastfeeding facts  Breast milk is the best source of nutrition for your baby and is available at birth. In the first couple of days, your milk volume is already starting to increase, though it may not be noticeable. Breastfeed frequently to increase your milk supply. Within three to five days, you will begin to notice larger milk volumes. An increase in breast size, heaviness and firmness are often described as the milk  coming in.  Frequent breastfeeding can help breasts from getting overly firm and painful. You will know the baby is getting enough milk if your baby is having wet and dirty diapers and gaining weight.     If your goal is to exclusively breastfeed, it is important to not use any formula or artificial nipples (including bottles and pacifiers) while your baby is learning to breastfeed.  While it may seem like an  easy  option to give your baby a bottle, formula should only be given if there is a medical reason for your baby to have it.    Positioning and attachment   Get comfortable.  Use pillows as needed to support your arms and baby.  Hold baby close at the level of your breast, facing you in a tummy to tummy position.  Skin to skin helps with this.  Position the baby with his or her nose by the nipple.  There should be a straight line from  baby s ear to shoulder to hips.  Tickle your baby s lips or wait for baby to open mouth wide, bring baby to breast by leading with the chin.  Aim the nipple at the roof of baby s mouth.  A rapid sucking pattern is followed by longer, drawing pattern with occasional swallows heard.  When baby is correctly latched, your nipple and much of the areola are pulled well into baby s mouth.      Returning to work or school  Focus on a good start to breastfeeding.  Many women continue to provide breastmilk for their baby when they return to work or school.  Making plans about where to pump and store milk can make the transition go well.  Talk with other mothers who have also returned to work or school for tips and support.  Your employer s Human Resource department may be a resource as well.     Returning to work or school: (continued)     babies can mean fewer  sick  days for you.    A quality breast pump will also save time and add comfort.  Check with your insurance prior to giving birth for breast pump coverage.  Many insurance companies include a pump within your benefits.    Wait until your baby is at least three weeks old to introduce a bottle for the first time.  Have someone besides you give the bottle.    Breastfeed when you are with your baby. Reserve your bottles of breast milk for when you are away.     Your breasts will need to be  emptied  either by your baby or a pump.  Plan to pump at least twice in an eight hour day.    If you cannot pump at work, continue breastfeeding at home. Any amount of breast milk is worth giving to your baby.    Formula feeding facts  If you are planning to use formula to feed your baby, you will want to make some preparations ahead of time. Talk to your doctor or nurse-midwife about what type of formula to use. Some are iron-fortified, meaning they have extra iron in them. You will want to purchase formula and bottles before your baby is born to be sure you are ready after  you return from the hospital. The [facility] do not provide formula samples to take home.    Be sure to follow formula mixing directions closely. Regular milk in the dairy case at the grocery store should not be given to babies under 1 year old. Baby formula is sold in several forms including:    Ready-to-use. This is the most expensive, but no mixing is necessary.    Concentrated liquid. This is less expensive than ready-to-use and you mix with water.    Powder. This is the least expensive. You mix one level scoop of powdered formula with two ounces of water and stir well.    Most babies need 2.5 ounces of formula per pound of body weight each day. This means an 8-pound baby may drink about 20 ounces of formula a day; however, this is just an estimate. The most important thing is to pay attention to your baby s cues.  If your baby is always fussy, needs more iron or has certain food allergies, your physician may suggest you change your baby s formula to a different kind.   How do I warm my baby s bottles?  You may feed your baby a bottle without warming it first. It is OK for the breast milk or formula to be cool or room temperature. If your baby seems to prefer it warmed, you can put the filled bottle in a container of warm water and let it stand for a few minutes. Check the temperature of the liquid on your skin before feeding it to your baby; to be sure it isn t too hot. Do not heat bottles in the microwave. Microwaves heat food and liquids unevenly, and this can cause hot spots that can burn your baby.    How do I clean and sterilize bottles?  Sterilize bottles and nipples before you use them for the first time. You can do this by putting them in boiling water for 5 minutes. After that first time, you can wash them in hot and soapy water. Rinse them carefully to be sure there is no soap left on them. You can also wash them in the .    [facility] Connection [phone] ([phone])

## 2021-05-28 NOTE — PATIENT INSTRUCTIONS - HE
Patient Education   HEALTHY PREGNANCY CARE: 37 to 41 WEEKS PREGNANT    Talk with your midwife or physician about when to call with signs of labor    Regular uterine contractions that are getting closer together and/or stronger    If you think your water has broken or is leaking    Bleeding from the vagina like a period (bloody vaginal discharge is normal)    If you are not feeling your baby move    Make plans for transportation and  as needed for when you are going to the hospital.    Your midwife or physician may offer to check your cervix for changes.     Ask your health care provider about vaccinations you may need following delivery. By now, you should have received a Tdap immunization to protect against pertussis or whooping cough. Fathers and family members who will be in close contact with the baby should also receive a Tdap shot at least two weeks before the expected birth of the baby if they have not had a Td (tetanus) shot for at least two years.    If you are past your due date, discuss the next steps leading to delivery with your midwife or physician. If you don't start labor on your own by 41 or 42 weeks, your midwife or physician may recommend giving you medicines to ripen your cervix and start labor.    Preparing for your baby: Tell your midwife or physician how you plan to feed your baby (breast or bottle), who you have chosen to do pediatric care for your baby, and if you have a boy, whether you have chosen to have him circumcised. You will need a car seat correctly installed in your vehicle to bring your baby home. As you start to set up the nursery at home for your baby, make sure the crib is safe. The mattress needs to fit snugly against the edges of the crib. If you can fit a soda can between the bars, they are too far apart and can allow the baby's head to caught between them.    Learn about infant care and feeding, including information about infant CPR. We recommend that you put  your baby to sleep on his or her back to reduce the chance of Sudden Infant Death Syndrome (SIDS). To maintain a healthy environment in which your child can grow, it's best to keep your home smoke-free. By preparing ahead, your transition into parenthood will go smoothly for you and your baby.    Your midwife or physician will want to see you for a checkup 2 to 6 weeks after delivery.    If you have questions about any symptoms you are experiencing or any other concerns, call your provider or their clinic staff at [facility] at Phone: [phone]. If it's after clinic hours, physician patients should call the Care Connection at [phone] ([phone]); midwife patients should call their answering service at [phone].    How can you care for yourself at home?   You can refer to the Starting Out Right book or find it online at [email or web address]/images/[email or web address]/maternity/[facility][email or web address]-Right.pdf or [email or web address].[email or web address]/images/[email or web address]/flipbooks/[facility]-starting-out-right/[facility]-starting-[facility].html#p=8     You can sign up for a weekly parenting e-mail that gives support, tips and advice from health care professionals that starts with pregnancy and continues through the toddler years. To register, go to [email or web address] at any time during your pregnancy.        Making Plans for Feeding My Baby    By this point, you probably have read a lot about feeding your baby.  Breastfeed or formula? Each mother s decision is her own and [facility] respects you and your choices. We ve gathered information on both breastfeeding and formula feeding to help with your decision. Talking with your physician or nurse-midwife can also help in your decision.  However you plan to feed your baby, [facility] encourage rooming in with your baby, skin-to-skin contact and feeding your baby based on his or her cues.    Skin-to-skin contact  Being close to mom helps  your baby adjust to life outside of the womb.  It helps your baby regulate their body temperature, heart rate, and breathing.  Your baby will usually be placed skin-to-skin immediately following birth or as soon as possible, if medical intervention is needed.    Rooming-In  Having your baby stay with you in your room is called  rooming-in .  Keeping your baby in your room helps you to learn how to care for your baby by getting to know your baby s cues, body rhythms and sleep cycle.       Cue-based feeding  Cues (signals) are baby s way of telling you what he or she wants.  When you learn your infant s cues, you know how to care for and feed your baby.   Feeding cues are the licking and smacking of lips, bringing their fist to their mouth, and a reflex called  rooting - where baby turns and opens his or her mouth, searching for the breast or bottle.  Crying is a late feeding cue.  Babies can feed frequently, often at least 8 times in 24 hours.  Breastfeeding facts  Breast milk is the best source of nutrition for your baby and is available at birth. In the first couple of days, your milk volume is already starting to increase, though it may not be noticeable. Breastfeed frequently to increase your milk supply. Within three to five days, you will begin to notice larger milk volumes. An increase in breast size, heaviness and firmness are often described as the milk  coming in.  Frequent breastfeeding can help breasts from getting overly firm and painful. You will know the baby is getting enough milk if your baby is having wet and dirty diapers and gaining weight.     If your goal is to exclusively breastfeed, it is important to not use any formula or artificial nipples (including bottles and pacifiers) while your baby is learning to breastfeed.  While it may seem like an  easy  option to give your baby a bottle, formula should only be given if there is a medical reason for your baby to have it.    Positioning and  attachment   Get comfortable.  Use pillows as needed to support your arms and baby.  Hold baby close at the level of your breast, facing you in a tummy to tummy position.  Skin to skin helps with this.  Position the baby with his or her nose by the nipple.  There should be a straight line from baby s ear to shoulder to hips.  Tickle your baby s lips or wait for baby to open mouth wide, bring baby to breast by leading with the chin.  Aim the nipple at the roof of baby s mouth.  A rapid sucking pattern is followed by longer, drawing pattern with occasional swallows heard.  When baby is correctly latched, your nipple and much of the areola are pulled well into baby s mouth.      Returning to work or school  Focus on a good start to breastfeeding.  Many women continue to provide breastmilk for their baby when they return to work or school.  Making plans about where to pump and store milk can make the transition go well.  Talk with other mothers who have also returned to work or school for tips and support.  Your employer s Human Resource department may be a resource as well.     Returning to work or school: (continued)     babies can mean fewer  sick  days for you.    A quality breast pump will also save time and add comfort.  Check with your insurance prior to giving birth for breast pump coverage.  Many insurance companies include a pump within your benefits.    Wait until your baby is at least three weeks old to introduce a bottle for the first time.  Have someone besides you give the bottle.    Breastfeed when you are with your baby. Reserve your bottles of breast milk for when you are away.     Your breasts will need to be  emptied  either by your baby or a pump.  Plan to pump at least twice in an eight hour day.    If you cannot pump at work, continue breastfeeding at home. Any amount of breast milk is worth giving to your baby.    Formula feeding facts  If you are planning to use formula to feed your  baby, you will want to make some preparations ahead of time. Talk to your doctor or nurse-midwife about what type of formula to use. Some are iron-fortified, meaning they have extra iron in them. You will want to purchase formula and bottles before your baby is born to be sure you are ready after you return from the hospital. [facility] do not provide formula samples to take home.    Be sure to follow formula mixing directions closely. Regular milk in the dairy case at the grocery store should not be given to babies under 1 year old. Baby formula is sold in several forms including:    Ready-to-use. This is the most expensive, but no mixing is necessary.    Concentrated liquid. This is less expensive than ready-to-use and you mix with water.    Powder. This is the least expensive. You mix one level scoop of powdered formula with two ounces of water and stir well.    Most babies need 2.5 ounces of formula per pound of body weight each day. This means an 8-pound baby may drink about 20 ounces of formula a day; however, this is just an estimate. The most important thing is to pay attention to your baby s cues.  If your baby is always fussy, needs more iron or has certain food allergies, your physician may suggest you change your baby s formula to a different kind.   How do I warm my baby s bottles?  You may feed your baby a bottle without warming it first. It is OK for the breast milk or formula to be cool or room temperature. If your baby seems to prefer it warmed, you can put the filled bottle in a container of warm water and let it stand for a few minutes. Check the temperature of the liquid on your skin before feeding it to your baby; to be sure it isn t too hot. Do not heat bottles in the microwave. Microwaves heat food and liquids unevenly, and this can cause hot spots that can burn your baby.    How do I clean and sterilize bottles?  Sterilize bottles and nipples before you use them for the first  time. You can do this by putting them in boiling water for 5 minutes. After that first time, you can wash them in hot and soapy water. Rinse them carefully to be sure there is no soap left on them. You can also wash them in the .    [facility] Connection [phone] ([phone])

## 2021-05-28 NOTE — PROGRESS NOTES
Patient reports she has been feeling well.  She has had no leakage of fluid or bleeding, no contractions, no lower extremity edema.  She feels good fetal movement.  She has had some back pain and lower belly pain when she is up on her feet at work.  She would like to try a maternity support belt for this.  She had one in the distant past but cannot find this.  It seemed to work well for her.  We will supply this today.  Plan follow-up in 1 week with GBS next visit.

## 2021-05-30 VITALS — HEIGHT: 56 IN | WEIGHT: 116.5 LBS | BODY MASS INDEX: 26.21 KG/M2

## 2021-05-30 VITALS — BODY MASS INDEX: 25.78 KG/M2 | WEIGHT: 114.6 LBS | HEIGHT: 56 IN

## 2021-05-30 VITALS — WEIGHT: 112.7 LBS | BODY MASS INDEX: 25.35 KG/M2 | HEIGHT: 56 IN

## 2021-05-30 NOTE — PROGRESS NOTES
Assessment:     1. Postpartum exam  Patient is 8 weeks postpartum from normal standard vaginal delivery.  - Hemoglobin  - Gynecologic Cytology (PAP Smear)  - norgestimate-ethinyl estradiol (ORTHO TRI-CYCLEN LO) 0.18/0.215/0.25 mg-25 mcg Tab tablet; Take 1 tablet by mouth daily.  Dispense: 3 Package; Refill: 3    2. Contraception  She is used oral birth control pills in the past and would like to restart them.  Her plan is to start the birth control pills this month and then wean off the breast-feeding and return to work August 12.        Plan:     Return to work August 12 letter head note is written for her employer.  Birth control pills are sent to her pharmacy and she is instructed to start them this month.  Return in 1 year for follow-up 1 year exam.    This is a 25 minute visit with greater than 50% of the time spent counseling regarding explaining oral contraception and the need for Pap smear postpartum.  An  is here to help us      Subjective:      Angela is a 34 y.o. female presenting to my clinic for follow-up postpartum exam.  Patient is 8 weeks postpartum.  She is breast-feeding well.  She has not had any menses.  No vaginal bleeding.  Her   has been helping her with the baby.  And her to other girls.  She now has 3 grown children.    Patient is come here from [facility] 5 years ago.  She does not speak adequate English and she has an  here with us today.  The interpretive process goes very well.  Patient has ample opportunity to ask questions and expresses her understanding.  She is particularly happy that her provider Dr. Louis had a baby boy.      Current Outpatient Medications on File Prior to Visit   Medication Sig Dispense Refill     acetaminophen (TYLENOL) 325 MG tablet Take 1-2 tablets (325-650 mg total) by mouth every 4 (four) hours as needed.  0     benzocaine 20% - menthol 0.5% (DERMOPLAST) 20-0.5 % Aero Apply 1 application topically 4 (four) times a day as needed  (pain).  0     dibucaine (NUPERCAINAL) 1 % ointment Apply topically as needed for pain (hemorrhoids).  0     ibuprofen (ADVIL,MOTRIN) 800 MG tablet Take 1 tablet (800 mg total) by mouth every 8 (eight) hours as needed for pain. 30 tablet 0     lanolin (LANSINOH HPA) 100 % Oint Apply 1 application topically every hour as needed (for sore nipples after evaluation of breastfeeding technique).  0     PNV,calcium 72-iron-folic acid (PRENATAL PLUS, CALCIUM CARB,) 27 mg iron- 1 mg Tab Take 1 tablet by mouth daily. 90 tablet 3     witch hazel (TUCKS) 12.5-50 % PadM Apply 1 application topically every hour as needed (perineal pain).  0     No current facility-administered medications on file prior to visit.      No Known Allergies  Past Medical History:   Diagnosis Date      (normal spontaneous vaginal delivery)      Spontaneous vaginal delivery      History reviewed. No pertinent surgical history.  Social History     Socioeconomic History     Marital status: Single     Spouse name: Varun Lucia     Number of children: 1     Years of education: ESL education     Highest education level: Not on file   Occupational History     Occupation:      Comment: 40 hrs/wk   Social Needs     Financial resource strain: Not on file     Food insecurity:     Worry: Not on file     Inability: Not on file     Transportation needs:     Medical: Not on file     Non-medical: Not on file   Tobacco Use     Smoking status: Never Smoker     Smokeless tobacco: Never Used   Substance and Sexual Activity     Alcohol use: No     Drug use: No     Sexual activity: Yes     Partners: Male     Birth control/protection: None     Comment:  Xeng   Lifestyle     Physical activity:     Days per week: Not on file     Minutes per session: Not on file     Stress: Not on file   Relationships     Social connections:     Talks on phone: Not on file     Gets together: Not on file     Attends [employer] service: Not on file     Active member of club  or organization: Not on file     Attends meetings of clubs or organizations: Not on file     Relationship status: Not on file     Intimate partner violence:     Fear of current or ex partner: Not on file     Emotionally abused: Not on file     Physically abused: Not on file     Forced sexual activity: Not on file   Other Topics Concern     Not on file   Social History Narrative     Not on file     Family History   Problem Relation Age of Onset     No Medical Problems Mother      No Medical Problems Father      No Medical Problems Sister      No Medical Problems Brother      No Medical Problems Daughter      No Medical Problems Sister      No Medical Problems Sister      Breast cancer Neg Hx      Cancer Neg Hx      Colon cancer Neg Hx      Diabetes Neg Hx      Heart disease Neg Hx        ROS:  I have performed a 10 point ROS.  All pertinent positives and negatives are found in the HPI.  All others are negative.    No fever chills nausea vomiting, no uterine cramping, no vaginal bleeding.  No problems with breast-feeding.    Objective:     Physical Exam:  BP 98/60 (Patient Site: Right Arm, Patient Position: Sitting, Cuff Size: Adult Regular)   Pulse 64   Temp 98.5  F (36.9  C) (Oral)   Resp 12   Wt 120 lb 1.6 oz (54.5 kg)   LMP 08/21/2018 (Exact Date) Comment: regular menses prior  BMI 26.69 kg/m    General Appearance: Alert, cooperative, no distress, appears stated age  Head: Normocephalic, without obvious abnormality, atraumatic  Eyes: PERRL, conjunctiva/corneas clear, EOM's intact  Ears: Normal TM's and external ear canals, both ears  Nose: Nares normal, septum midline,mucosa normal, no drainage  Throat: Lips, mucosa, and tongue normal; teeth and gums normal  Neck: Supple, symmetrical, trachea midline, no adenopathy;  thyroid: not enlarged, symmetric, no tenderness/mass/nodules; no carotid bruit or JVD  Lungs: Clear to auscultation bilaterally, respirations unlabored  Heart: Regular rate and rhythm, S1 and S2  normal, no murmur, rub, or gallop,     Abdomen: Soft, non-tender, bowel sounds active all four quadrants,  no masses, no organomegaly  Back: Symmetric, no curvature, ROM normal, no CVA tenderness  Extremities: Extremities normal, atraumatic, no cyanosis or edema   exam.  Normal external genitalia.  No signs of tears or abrasions or ecchymosis.  Cervix is viewed in a normal parvus cervix is noted.  Pap smear is obtained.  No bleeding.  Bimanual exam shows a modest enlarged uterus about 8 cm.  No tenderness, normal adnexal area    Skin: Skin color, texture, turgor normal, no rashes or lesions  Lymph nodes: Cervical, supraclavicular, and axillary nodes normal  Neurologic: Intact, no focal deficits   Mental status:  Appropriate, Affect normal/very cheerful    Hemoglobin drawn today  Pap smear sent

## 2021-05-31 VITALS — BODY MASS INDEX: 27.2 KG/M2 | HEIGHT: 56 IN | WEIGHT: 120.9 LBS

## 2021-05-31 VITALS — BODY MASS INDEX: 27.08 KG/M2 | HEIGHT: 56 IN | WEIGHT: 120.4 LBS

## 2021-05-31 VITALS — WEIGHT: 142.5 LBS | HEIGHT: 56 IN | BODY MASS INDEX: 32.06 KG/M2

## 2021-05-31 VITALS — HEIGHT: 57 IN | BODY MASS INDEX: 28.65 KG/M2 | WEIGHT: 132.8 LBS

## 2021-05-31 VITALS — HEIGHT: 57 IN | BODY MASS INDEX: 30.38 KG/M2 | WEIGHT: 140.8 LBS

## 2021-05-31 VITALS — WEIGHT: 143.7 LBS | BODY MASS INDEX: 32.32 KG/M2 | HEIGHT: 56 IN

## 2021-05-31 VITALS — HEIGHT: 57 IN | WEIGHT: 135.9 LBS | BODY MASS INDEX: 29.32 KG/M2

## 2021-05-31 VITALS — HEIGHT: 56 IN | BODY MASS INDEX: 32.46 KG/M2 | WEIGHT: 144.3 LBS

## 2021-05-31 VITALS — BODY MASS INDEX: 28.18 KG/M2 | WEIGHT: 130.6 LBS | HEIGHT: 57 IN

## 2021-05-31 VITALS — HEIGHT: 57 IN | BODY MASS INDEX: 27.79 KG/M2 | WEIGHT: 128.8 LBS

## 2021-06-02 VITALS — HEIGHT: 56 IN | WEIGHT: 136.4 LBS | BODY MASS INDEX: 30.68 KG/M2

## 2021-06-02 VITALS — BODY MASS INDEX: 27.96 KG/M2 | WEIGHT: 124.3 LBS | HEIGHT: 56 IN

## 2021-06-02 VITALS — BODY MASS INDEX: 26.75 KG/M2 | HEIGHT: 56 IN | WEIGHT: 118.9 LBS

## 2021-06-02 VITALS — HEIGHT: 56 IN | BODY MASS INDEX: 29 KG/M2 | WEIGHT: 128.9 LBS

## 2021-06-02 VITALS — BODY MASS INDEX: 27.02 KG/M2 | HEIGHT: 56 IN | WEIGHT: 120.1 LBS

## 2021-06-02 VITALS — HEIGHT: 56 IN | BODY MASS INDEX: 27.11 KG/M2 | WEIGHT: 120.5 LBS

## 2021-06-02 VITALS — WEIGHT: 134.3 LBS | BODY MASS INDEX: 30.21 KG/M2 | HEIGHT: 56 IN

## 2021-06-03 VITALS — BODY MASS INDEX: 30.91 KG/M2 | HEIGHT: 56 IN | WEIGHT: 137.4 LBS

## 2021-06-03 VITALS — WEIGHT: 139.9 LBS | BODY MASS INDEX: 31.47 KG/M2 | HEIGHT: 56 IN

## 2021-06-03 VITALS — HEIGHT: 56 IN | WEIGHT: 139 LBS | BODY MASS INDEX: 31.27 KG/M2

## 2021-06-03 VITALS — BODY MASS INDEX: 26.69 KG/M2 | WEIGHT: 120.1 LBS

## 2021-06-05 VITALS
HEART RATE: 72 BPM | BODY MASS INDEX: 27.04 KG/M2 | SYSTOLIC BLOOD PRESSURE: 100 MMHG | DIASTOLIC BLOOD PRESSURE: 41 MMHG | WEIGHT: 120.2 LBS | HEIGHT: 56 IN

## 2021-06-09 NOTE — TELEPHONE ENCOUNTER
RN cannot approve Refill Request    RN can NOT refill this medication PCP messaged that patient is overdue for Office Visit. Last office visit: Visit date not found Last Physical: Visit date not found Last MTM visit: Visit date not found Last visit same specialty: 10/31/2018 Genoveva Louis MD.  Next visit within 3 mo: Visit date not found  Next physical within 3 mo: Visit date not found      Greta Alarcon, [facility] Triage/Med Refill 6/21/2020    Requested Prescriptions   Pending Prescriptions Disp Refills     TRI-LO-SPRINTEC 0.18/0.215/0.25 mg-25 mcg tablet [Pharmacy Med Name: TRI-LO-SPRINTEC TABLETS 28S] 84 tablet 0     Sig: TAKE 1 TABLET BY MOUTH DAILY       Oral Contraceptives Protocol Failed - 6/21/2020 10:14 AM        Failed - Visit with PCP or prescribing provider visit in last 12 months      Last office visit with prescriber/PCP: Visit date not found OR same dept: Visit date not found OR same specialty: 10/31/2018 Genoveva Louis MD  Last physical: Visit date not found Last MTM visit: Visit date not found   Next visit within 3 mo: Visit date not found  Next physical within 3 mo: Visit date not found  Prescriber OR PCP: Ana Estrada MD  Last diagnosis associated with med order: 1. Postpartum exam  - TRI-LO-SPRINTEC 0.18/0.215/0.25 mg-25 mcg tablet [Pharmacy Med Name: TRI-LO-SPRINTEC TABLETS 28S]; TAKE 1 TABLET BY MOUTH DAILY  Dispense: 84 tablet; Refill: 0    If protocol passes may refill for 12 months if within 3 months of last provider visit (or a total of 15 months).

## 2021-06-09 NOTE — PROGRESS NOTES
"  Subjective:       Ambrose De La Rosa is a 31 y.o. female who presents for confirmation of pregnancy.  She delivered a healthy female infant via normal spontaneous vaginal delivery on 5/8/16.  She states that she was trying to achieve another pregnancy.  She was not taking anything for birth control.  She has not yet started a prenatal vitamin.  Her last menstrual period occurred on 12/23/16 and she was having regular menses prior to this.  She is sure about this date.  This would put her at 9 weeks 3 days gestation with an EDC of 9/29/17.  She overall reports that she has been feeling well.  She denies any significant nausea, vomiting, or fatigue.    The following portions of the patient's history were reviewed and updated as appropriate: allergies, current medications and problem list.    Review of Systems   Pertinent items are noted in HPI.      Objective:        Visit Vitals     BP 98/60 (Patient Site: Left Arm, Patient Position: Sitting, Cuff Size: Adult Regular)     Pulse 64     Resp 16     Ht 4' 8.24\" (1.428 m)     Wt 114 lb 9.6 oz (52 kg)     LMP 12/23/2016     Breastfeeding No     BMI 25.47 kg/m2       General appearance: alert, appears stated age and cooperative  Due to the nature of the visit, no further examination was performed today.        Results for orders placed or performed in visit on 02/27/17   Pregnancy, Urine   Result Value Ref Range    Pregnancy Test, Urine Positive (!) Negative          Assessment/Plan:       1. 9 weeks gestation of pregnancy  Discussed early pregnancy recommendations with the patient today via .  Recommended scheduling her first OB visit within the next 3 weeks.  She will let me know if she develops significant nausea in the meantime.  Prescription for prenatal vitamin was sent to pharmacy.    2. Amenorrhea  - Pregnancy, Urine        "

## 2021-06-09 NOTE — PROGRESS NOTES
PRENATAL VISIT   FIRST OBSTETRICAL EXAM - OB    Assessment / Plan     Encounter for supervision of other normal pregnancy in first trimester  Normal first prenatal visit at 12w3d.  Discussed orientation, general information, lifestyle, nutrition, exercise, warning signs, resources, lab testing and risk screening with patient.  Questions answered.  Initial labs drawn.  Prenatal vitamins.  Problem list reviewed and updated.  Genetic screening test options discussed:  Patient elects to decline all testing  Role of ultrasound in pregnancy discussed; fetal survey: requested.  Follow up: Return in 4 weeks (on 2017).  - ABO/RH Typing (OP order)  - Hepatitis B Surface antigen (HBsAG)  - HIV Antigen/Antibody Screening Cascade  - HM1(CBC and Differential)  - HML Antibody Screen  - RPR  - Rubella Immune Status (IgG)  - Urinalysis Macroscopic  - Culture, Urine  - Chlamydia/gonorrhoeae CERVIX  - HM1 (CBC with Diff)       Subjective:    Ambrose De La Rosa is a 31 y.o.  here today for her First Obstetrical Exam.     She overall has been feeling very well.  With her previous pregnancy, which was a female infant, she had mild nausea.  She reported mild nausea for the first month or so of this pregnancy as well, this has resolved.  She has some breast tenderness but otherwise no complaints.  She denies any leakage of fluid or bleeding.    OB History    Para Term  AB SAB TAB Ectopic Multiple Living   2 1 1      0 1      # Outcome Date GA Lbr Minesh/2nd Weight Sex Delivery Anes PTL Lv   2 Current            1 Term 16 39w5d 42:50 / 00:39 6 lb 11 oz (3.033 kg) F Vag-Spont Inhalation,Local N Y          Expected Date of Delivery: 2017, by Last Menstrual Period    Past Medical History:   Diagnosis Date     Spontaneous vaginal delivery      History reviewed. No pertinent surgical history.  Social History   Substance Use Topics     Smoking status: Never Smoker     Smokeless tobacco: None     Alcohol use No     Current  "Outpatient Prescriptions   Medication Sig Dispense Refill     PNV with calcium no.72-iron-FA (PRENATAL PLUS, CALCIUM CARB,) 27 mg iron- 1 mg Tab Take 1 tablet by mouth daily. 90 tablet 3     No current facility-administered medications for this visit.      No Known Allergies          High Risk Behavior: Last birth within 1 year    Review of Systems  General:  Denies problem  Eyes: Denies problem  Ears/Nose/Throat: Denies problem  Cardiovascular: Denies problem  Respiratory:  Denies problem  Gastrointestinal:  Denies problem  Genitourinary: Denies problem  Musculoskeletal:  Denies problem  Skin: Denies problem  Neurologic: Denies problem  Psychiatric: Denies problem  Endocrine: Denies problem  Heme/Lymphatic: Denies problem   Allergic/Immunologic: Denies problem       Objective:   Objective    Vitals:    03/20/17 0912   BP: 96/54   Pulse: 72   Resp: 20   Weight: 112 lb 11.2 oz (51.1 kg)   Height: 4' 8.24\" (1.428 m)     Physical Exam:  General Appearance: Alert, cooperative, no distress, appears stated age  Head: Normocephalic, without obvious abnormality, atraumatic  Eyes: PERRL, conjunctiva/corneas clear, EOM's intact  Ears: Normal TM's and external ear canals, both ears  Nose: Nares normal, septum midline,mucosa normal, no drainage  Throat: Lips, mucosa, and tongue normal; teeth and gums normal  Neck: Supple, symmetrical, trachea midline, no adenopathy; thyroid: not enlarged, symmetric, no tenderness/mass/nodules  Back: Symmetric, no curvature, ROM normal, no CVA tenderness  Lungs: Clear to auscultation bilaterally, respirations unlabored  Breasts: No breast masses, tenderness, asymmetry, or nipple discharge  Heart: Regular rate and rhythm, S1 and S2 normal, no murmur, rub, or gallop  Abdomen: Soft, non-tender, bowel sounds active all four quadrants, no masses, no organomegaly  Pelvic: Normally developed genitalia with no external lesions or eruptions. Uterus normal to palpation.  No adnexal mass or " tenderness.  Extremities: Extremities normal, atraumatic, no cyanosis or edema  Skin: Skin color, texture, turgor normal, no rashes or lesions  Lymph nodes: Cervical, supraclavicular, and axillary nodes normal  Neurologic: Normal     Lab:   Results for orders placed or performed in visit on 03/20/17   Urinalysis Macroscopic   Result Value Ref Range    Color, UA Yellow Colorless, Yellow, Straw, Light Yellow    Clarity, UA Clear Clear    Glucose, UA Negative Negative    Bilirubin, UA Negative Negative    Ketones, UA Negative Negative    Specific Gravity, UA 1.025 1.005 - 1.030    Blood, UA Trace (!) Negative    pH, UA 6.5 5.0 - 8.0    Protein, UA 30 mg/dL (!) Negative mg/dL    Urobilinogen, UA 1.0 E.U./dL 0.2 E.U./dL, 1.0 E.U./dL    Nitrite, UA Negative Negative    Leukocytes, UA Moderate (!) Negative

## 2021-06-10 NOTE — PROGRESS NOTES
Feeling well with the exception of some mild lower back pain.  This happened after picking up her daughter yesterday, seems better today.  No radiation into the lower extremities.  Discussed that she can place heat to the low back, no heat to the abdomen.  She can take Tylenol as needed.  Discussed my current pregnancy and upcoming maternity leave.  Discussed options and patient would like to transfer to the midwives for care temporarily until my return.  She is feeling baby move.  Discussed fetal survey ultrasound and will assist patient in arranging this.  Plan follow-up in 4 weeks.

## 2021-06-11 NOTE — PROGRESS NOTES
Called number listed and spoke with  who is labeled as patient representative. Informed of 28 week lab results. Patient to keep next prenatal appointment     LEO Rojas CNM

## 2021-06-11 NOTE — PROGRESS NOTES
PRENATAL VISIT   TRANSFER OF CARE    Assessment / Impression   CHRISTIANO visit at 25w5d      Plan:     - Prenatal records reviewed. Labs in prenatal chart (internal transfer) and reviewed by this writer.  - Reviewed prenatal care schedule for remainder of pregnancy.  Patient plans to transfer back to Dr. Louis in the third trimester.  -Optimal nutrition and weight gain discussed.  Normal weight gain thus far and weight gain graph reviewed.  Pregnancy weight gain of 25-35 lbs (BMI 18.5-24.9) encouraged.   -Danger s/sx for late  second trimester reviewed with patient.  On-call CNM contact information reviewed.  Encouraged patient to call with any  labor or other warning signs as reviewed.  -[facility] CNM packet given and reviewed with patient.  -Return to clinic 2-3 weeks. Plan GCT , Hgb, RPR and Tdap next visit.     Total time spent with patient: 40 minutes, >50% time spent counseling and coordinating care.    Subjective:    Ambrose De La Rosa is a 32 y.o.  here with a professional [employer]  today for her here today for her transfer of care visit. She is transferring care from Dr. Genoveva Louis ([facility] family medicine as she is on maternity leave). She plans to transfer back to Dr. Louis when she is back from her maternity leave, for the remainder of her pregnancy and birth. She began her prenatal care on 3/20/17 at 12 weeks gestation. She had a total of 3 visits before transfer of care. Pre-pregnancy weight &  pre-pregnancy BMI as noted.  Anatomy ultrasound reviewed and put in dating section. Review of previous records show the following concerns/problems: none.        She offers no questions or concerns.  She denies any  labor symptoms, leaking, bleeding.  Reports normal fetal movements.  Accepts Tdap at next visit.      Education level: ESL education. In [place] 4 years.   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   Partners name: Varun Lucia  Partners occupation:     OB History  "   Para Term  AB Living   2 1 1   1   SAB TAB Ectopic Multiple Live Births      0 1      # Outcome Date GA Lbr Minesh/2nd Weight Sex Delivery Anes PTL Lv   2 Current            1 Term 16 39w5d 42:50 / 00:39 6 lb 11 oz (3.033 kg) F Vag-Spont Inhalation,Local N SADAF        Safe in relationship. Denies current or past abuse.     Expected Date of Delivery: 2017, by Last Menstrual Period    Past Medical History:   Diagnosis Date     Spontaneous vaginal delivery      History reviewed. No pertinent surgical history.  Social History   Substance Use Topics     Smoking status: Never Smoker     Smokeless tobacco: None     Alcohol use No     Current Outpatient Prescriptions   Medication Sig Dispense Refill     PNV with calcium no.72-iron-FA (PRENATAL PLUS, CALCIUM CARB,) 27 mg iron- 1 mg Tab Take 1 tablet by mouth daily. 90 tablet 3     No current facility-administered medications for this visit.      No Known Allergies     Review of Systems  General:  Denies problem  Eyes: Denies problem  Ears/Nose/Throat: Denies problem  Cardiovascular: Denies problem  Respiratory:  Denies problem  Gastrointestinal:  Denies problem  Genitourinary: Denies problem  Musculoskeletal:  Denies problem  Skin: Denies problem  Neurologic: Denies problem  Psychiatric: Denies problem  Endocrine: Denies problem  Heme/Lymphatic: Denies problem   Allergic/Immunologic: Denies problem       Objective:   Objective    Vitals:    17 1010   BP: 96/52   Pulse: 76   Weight: 128 lb 12.8 oz (58.4 kg)   Height: 4' 8.5\" (1.435 m)       Physical Exam:    Exam performed at [facility] with Dr. Louis on 3/20/17. All findings WNL and documented in prenatal records.     General Appearance: Alert, cooperative, no distress, appears stated age  FHT: 154   Neurologic: Alert and oriented x 3.    "

## 2021-06-11 NOTE — PROGRESS NOTES
Pa is here with a professional Ushi  for a routine prenatal visit at 28 weeks.  She denies questions or concerns and reports she is feeling well.  Active fetus.  Denies  labor symptoms, leaking, bleeding. Normal weight gain reviewed. Plans breastfeeding for 2 months then switch to formula. Blood type B pos. Accepts GCT, hgb, RPR today. Discussed recommendation for Tdap vaccine and patient accepts today. CA assisted scheduling patient's next appointment.  Patient plans to return to see Dr. Louis after that next visit.

## 2021-06-12 NOTE — PROGRESS NOTES
Here with professional [employer] . Feeling well, no questions or concerns today. Occasional BH contractions at work. Plans to work until birth. Plans 12 week OSWALD.  plans 2 wk off. Sister and auntie live nearby. Denies leaking or bleeding. Sleeping fairly well. EPDS 3. No hx of depression or PPD. Has visit scheduled with Dr. Louis on 9/6/17 for 36 wk visit. Disc GBS and hgb at that visit. Enc to call the on-call CNM with changes in fetal movement, PTL sx or other questions or concerns.

## 2021-06-12 NOTE — PROGRESS NOTES
Feeling well, good fetal movement.  No leakage of fluid or bleeding, no lower extremity edema.  Discussed preregistration at [facility].  GBS performed today.  Plan follow-up in 1 week.

## 2021-06-12 NOTE — TELEPHONE ENCOUNTER
RN cannot approve Refill Request    RN can NOT refill this medication Protocol failed and NO refill given. Last office visit: 10/31/2018 Genoveva Louis MD Last Physical: Visit date not found Last MTM visit: Visit date not found Last visit same specialty: 10/31/2018 Genoveva Louis MD.  Next visit within 3 mo: Visit date not found  Next physical within 3 mo: Visit date not found      Kirsten Norman, [facility] Triage/Med Refill 10/10/2020    Requested Prescriptions   Pending Prescriptions Disp Refills     norgestimate-ethinyl estradioL (TRI-LO-SPRINTEC) 0.18/0.215/0.25 mg-25 mcg tablet 84 tablet 0     Sig: Take 1 tablet by mouth daily.       Oral Contraceptives Protocol Failed - 10/10/2020  9:25 AM        Failed - Visit with PCP or prescribing provider visit in last 12 months      Last office visit with prescriber/PCP: 10/31/2018 Genoveva Louis MD OR same dept: Visit date not found OR same specialty: 10/31/2018 Genoveva Louis MD  Last physical: Visit date not found Last MTM visit: Visit date not found   Next visit within 3 mo: Visit date not found  Next physical within 3 mo: Visit date not found  Prescriber OR PCP: Genoveva Louis MD  Last diagnosis associated with med order: 1. Postpartum exam  - norgestimate-ethinyl estradioL (TRI-LO-SPRINTEC) 0.18/0.215/0.25 mg-25 mcg tablet; Take 1 tablet by mouth daily.  Dispense: 84 tablet; Refill: 0    If protocol passes may refill for 12 months if within 3 months of last provider visit (or a total of 15 months).

## 2021-06-12 NOTE — PROGRESS NOTES
Here with professional [employer] . Reviewed normal 28 week labs. Reviewed normal weight gain. Active fetus. Denies PTL symptoms. 32 week pregnancy check list initiated. Enc to call w/ PTL sx as reviewed. CA assisted with scheduling return visit with ERNESTO for 33 weeks. Will return to Dr. Louis at 36 weeks for reminder of pregnancy.

## 2021-06-13 NOTE — PROGRESS NOTES
No ctx, feeling well, no LOF/bleeding.  Good fetal movement.  No LE edema.  Reviewed who and when to call if she feels she is in labor.  Declines SVE today.  Plan follow-up in 1 week.

## 2021-06-13 NOTE — PROGRESS NOTES
Patient is feeling well.  She initially denies any contractions, then later through the  says that she had some contractions last week but these  out quickly.  She denies any leakage of fluid or bleeding.  She is having good fetal movement, no lower extremity edema.  Again reviewed who and when to call if she feels she is in labor.  She declines membrane sweeping.  Plan follow-up in 1 week if remains undelivered.

## 2021-06-13 NOTE — TELEPHONE ENCOUNTER
Patient requesting norgestimate-ethinyl estradioL (TRI-LO-SPRINTEC) 0.18/0.215/0.25 mg-25 mcg tablet refill. Left detailed message with [employer]  to schedule med check with Dr. Louis.

## 2021-06-13 NOTE — TELEPHONE ENCOUNTER
Requested Prescriptions     Pending Prescriptions Disp Refills     norgestimate-ethinyl estradioL (TRI-LO-SPRINTEC) 0.18/0.215/0.25 mg-25 mcg tablet 28 tablet 0     Sig: Take 1 tablet by mouth daily.

## 2021-06-13 NOTE — PROGRESS NOTES
Patient continues to feel well, denies LOF/bleeding, has no LE edema.  Reports ctx this AM,  out as the day progressed.  Baby continues to move well.  Declines membrane sweeping today, declines induction.  Plan follow-up in 1 week if remains undelivered.

## 2021-06-13 NOTE — TELEPHONE ENCOUNTER
RN cannot approve Refill Request    RN can NOT refill this medication PCP messaged that patient is overdue for Office Visit and Protocol failed and NO refill given. Last office visit: 10/31/2018 Genoveva Louis MD Last Physical: Visit date not found Last MTM visit: Visit date not found Last visit same specialty: 10/31/2018 Genoveva Louis MD.  Next visit within 3 mo: Visit date not found  Next physical within 3 mo: Visit date not found      Yadira Nicholson, [facility] Triage/Med Refill 11/11/2020    Requested Prescriptions   Pending Prescriptions Disp Refills     norgestimate-ethinyl estradioL (TRI-LO-SPRINTEC) 0.18/0.215/0.25 mg-25 mcg tablet 84 tablet 0     Sig: Take 1 tablet by mouth daily.       Oral Contraceptives Protocol Failed - 11/11/2020 12:04 PM        Failed - Visit with PCP or prescribing provider visit in last 12 months      Last office visit with prescriber/PCP: 10/31/2018 Genoveva Louis MD OR same dept: Visit date not found OR same specialty: 10/31/2018 Genoveva Louis MD  Last physical: Visit date not found Last MTM visit: Visit date not found   Next visit within 3 mo: Visit date not found  Next physical within 3 mo: Visit date not found  Prescriber OR PCP: Genoveva Louis MD  Last diagnosis associated with med order: 1. Postpartum exam  - norgestimate-ethinyl estradioL (TRI-LO-SPRINTEC) 0.18/0.215/0.25 mg-25 mcg tablet; Take 1 tablet by mouth daily.  Dispense: 28 tablet; Refill: 0  - norgestimate-ethinyl estradioL (TRI-LO-SPRINTEC) 0.18/0.215/0.25 mg-25 mcg tablet; Take 1 tablet by mouth daily.  Dispense: 84 tablet; Refill: 0    If protocol passes may refill for 12 months if within 3 months of last provider visit (or a total of 15 months).              Signed Prescriptions Disp Refills    norgestimate-ethinyl estradioL (TRI-LO-SPRINTEC) 0.18/0.215/0.25 mg-25 mcg tablet 28 tablet 0     Sig: Take 1 tablet by mouth daily.       Oral Contraceptives Protocol Failed -  10/10/2020  7:32 PM        Failed - Visit with PCP or prescribing provider visit in last 12 months      Last office visit with prescriber/PCP: 10/31/2018 Genoveva Louis MD OR same dept: Visit date not found OR same specialty: 10/31/2018 Genoveva Louis MD  Last physical: Visit date not found Last MTM visit: Visit date not found   Next visit within 3 mo: Visit date not found  Next physical within 3 mo: Visit date not found  Prescriber OR PCP: Genoveva Louis MD  Last diagnosis associated with med order: 1. Postpartum exam  - norgestimate-ethinyl estradioL (TRI-LO-SPRINTEC) 0.18/0.215/0.25 mg-25 mcg tablet; Take 1 tablet by mouth daily.  Dispense: 28 tablet; Refill: 0  - norgestimate-ethinyl estradioL (TRI-LO-SPRINTEC) 0.18/0.215/0.25 mg-25 mcg tablet; Take 1 tablet by mouth daily.  Dispense: 84 tablet; Refill: 0    If protocol passes may refill for 12 months if within 3 months of last provider visit (or a total of 15 months).

## 2021-06-14 NOTE — TELEPHONE ENCOUNTER
RN cannot approve Refill Request    RN can NOT refill this medication PCP messaged that patient is overdue for Office Visit. Last office visit: 10/31/2018 Genoveva Louis MD Last Physical: Visit date not found Last MTM visit: Visit date not found Last visit same specialty: 10/31/2018 Genoveva Louis MD.  Next visit within 3 mo: Visit date not found  Next physical within 3 mo: Visit date not found      Carmelina Dorado, [facility] Triage/Med Refill 12/24/2020    Requested Prescriptions   Pending Prescriptions Disp Refills     norgestimate-ethinyl estradioL (TRI-LO-SPRINTEC) 0.18/0.215/0.25 mg-25 mcg tablet 28 tablet 0     Sig: Take 1 tablet by mouth daily.       Oral Contraceptives Protocol Failed - 12/21/2020 12:19 PM        Failed - Visit with PCP or prescribing provider visit in last 12 months      Last office visit with prescriber/PCP: 10/31/2018 Genoveva Louis MD OR same dept: Visit date not found OR same specialty: 10/31/2018 Genoveva Louis MD  Last physical: Visit date not found Last MTM visit: Visit date not found   Next visit within 3 mo: Visit date not found  Next physical within 3 mo: Visit date not found  Prescriber OR PCP: Genoveva Louis MD  Last diagnosis associated with med order: 1. Postpartum exam  - norgestimate-ethinyl estradioL (TRI-LO-SPRINTEC) 0.18/0.215/0.25 mg-25 mcg tablet; Take 1 tablet by mouth daily.  Dispense: 28 tablet; Refill: 0    If protocol passes may refill for 12 months if within 3 months of last provider visit (or a total of 15 months).

## 2021-06-14 NOTE — TELEPHONE ENCOUNTER
1st attempt: LVM for patient to call us back to get scheduled for a in person visit or a video visit

## 2021-06-14 NOTE — PROGRESS NOTES
"  Assessment & Plan     Encounter for surveillance of contraceptive pills  Patient is doing well on her current OCP.  Refills were provided for 1 year.  Advised her to follow-up for a med check or complete physical exam in 1 year.  - norgestimate-ethinyl estradioL (TRI-LO-SPRINTEC) 0.18/0.215/0.25 mg-25 mcg tablet  Dispense: 84 tablet; Refill: 3           BMI:   Estimated body mass index is 26.71 kg/m  as calculated from the following:    Height as of this encounter: 4' 8.25\" (1.429 m).    Weight as of this encounter: 120 lb 3.2 oz (54.5 kg).       Return in about 1 year (around 1/12/2022) for Annual physical.    Genoveva Louis MD  [facility]     Ambrose De La Rosa is 35 y.o. and presents to clinic today for the following health issues   HPI     Patient presents today for a med check related to her oral contraceptive pill.  She feels well on this dose and does not wish to make any changes.  She has had no side effects.  She is planning to have more children in the future.  She has no other questions or concerns today, her Pap smear is up-to-date.  She is interviewed with the assistance of her , who provides interpretation in [facility] as needed.    Review of Systems  Negative except as noted above.      Objective    /41 (Patient Site: Left Arm, Patient Position: Sitting, Cuff Size: Adult Regular)   Pulse 72   Ht 4' 8.25\" (1.429 m)   Wt 120 lb 3.2 oz (54.5 kg)   LMP 12/15/2020   Breastfeeding No   BMI 26.71 kg/m    Body mass index is 26.71 kg/m .  Physical Exam  General: Well-developed well-nourished female, no apparent distress  Neurologic: Alert and oriented, good historian          "

## 2021-06-14 NOTE — PROGRESS NOTES
"  Assessment/Plan:       1. Encounter for postpartum visit  Routine postpartum visit, patient has been doing well.  She is bottle feeding, did not require perineal repair.  She denies symptoms of postpartum depression and desires OCPs for contraception as below.  She can follow up as needed.    2. Encounter for initial prescription of contraceptive pills  After a discussion of risks and benefits of all contraceptive methods, patient desires a trial of OCPs.  She has no personal or family history of blood clots.  Prescription was sent today.  She also accepted a brochure for Nexplanon.  Should she desire this, she will schedule this at her convenience.        Subjective:       Ambrose De La Rosa is a 32 y.o. female who presents for a routine postpartum visit.  She delivered a healthy female infant on 10/2/17 via vaginal delivery.  She did not sustain a laceration during delivery, required no repair.  She is bottle feeding and has no questions about infant feeding today.  She reports her mood has been good, and denies experiencing symptoms of postpartum depression.  She does desire contraception, but has no idea what form she would like to use.      The following portions of the patient's history were reviewed and updated as appropriate: allergies, current medications, past family history, past medical history, past social history and problem list.      Current Outpatient Prescriptions:      ibuprofen (ADVIL,MOTRIN) 200 MG tablet, Take 3 tablets (600 mg total) by mouth every 8 (eight) hours as needed for pain., Disp: 100 tablet, Rfl: 0     PNV with calcium no.72-iron-FA (PRENATAL PLUS, CALCIUM CARB,) 27 mg iron- 1 mg Tab, Take 1 tablet by mouth daily., Disp: 90 tablet, Rfl: 3    Review of Systems   Pertinent items are noted in HPI.      Objective:      /64 (Patient Site: Right Arm, Patient Position: Sitting, Cuff Size: Adult Regular)  Pulse 60  Resp 16  Ht 4' 8.25\" (1.429 m)  Wt 120 lb 6.4 oz (54.6 kg)  " Breastfeeding? No  BMI 26.75 kg/m2    General appearance: alert, appears stated age and cooperative  Head: Normocephalic, without obvious abnormality, atraumatic  Eyes: conjunctivae clear, sclerae anicteric  Psychiatric: speech is fluent and thought process is linear, affect is reactive and appropriate, mood is described as good

## 2021-06-14 NOTE — TELEPHONE ENCOUNTER
RN cannot approve Refill Request    RN can NOT refill this medication PCP messaged that patient is overdue for Office Visit. Last office visit: 10/31/2018 Genoveva Louis MD Last Physical: Visit date not found Last MTM visit: Visit date not found Last visit same specialty: 10/31/2018 Genoveva Louis MD.  Next visit within 3 mo: Visit date not found  Next physical within 3 mo: Visit date not found      Greta Alarcon, [facility] Triage/Med Refill 1/1/2021    Requested Prescriptions   Pending Prescriptions Disp Refills     TRI-LO-SPRINTEC 0.18/0.215/0.25 mg-25 mcg tablet [Pharmacy Med Name: TRI-LO-SPRINTEC TABLETS 28S] 28 tablet 0     Sig: TAKE 1 TABLET BY MOUTH DAILY       Oral Contraceptives Protocol Failed - 12/30/2020  3:28 PM        Failed - Visit with PCP or prescribing provider visit in last 12 months      Last office visit with prescriber/PCP: 10/31/2018 Genoveva Louis MD OR same dept: Visit date not found OR same specialty: 10/31/2018 Genoveva Louis MD  Last physical: Visit date not found Last MTM visit: Visit date not found   Next visit within 3 mo: Visit date not found  Next physical within 3 mo: Visit date not found  Prescriber OR PCP: Genoveva Louis MD  Last diagnosis associated with med order: 1. Postpartum exam  - TRI-LO-SPRINTEC 0.18/0.215/0.25 mg-25 mcg tablet [Pharmacy Med Name: TRI-LO-SPRINTEC TABLETS 28S]; TAKE 1 TABLET BY MOUTH DAILY  Dispense: 28 tablet; Refill: 0    If protocol passes may refill for 12 months if within 3 months of last provider visit (or a total of 15 months).

## 2021-06-19 NOTE — LETTER
Letter by Ana Estrada MD at      Author: Ana Estrada MD Service: -- Author Type: --    Filed:  Encounter Date: 7/22/2019 Status: (Other)         July 22, 2019     Patient: Ambrose De La Rosa   YOB: 1985   Date of Visit: 7/22/2019       To Whom It May Concern:    The patient, Ambrose De La Rosa, was seen today at my clinic. She can return to work on August 12, 2019.     If you have any questions or concerns, please don't hesitate to call.    Sincerely,        Electronically signed by Ana Estrada MD

## 2021-06-19 NOTE — LETTER
Letter by Ana Estrada MD at      Author: Ana Estrada MD Service: -- Author Type: --    Filed:  Encounter Date: 7/28/2019 Status: (Other)         Pa Estrella  [address] [address]             July 29, 2019         Dear Ms. De La Rosa,    Below are the results from your recent visit:    Resulted Orders   Hemoglobin   Result Value Ref Range    Hemoglobin 13.3 12.0 - 16.0 g/dL   Gynecologic Cytology (PAP Smear)   Result Value Ref Range    Case Report       Gynecologic Cytology Report                       Case: [id number]                                   Authorizing Provider:  Ana Estrada MD       Collected:           07/22/2019 1702              Ordering Location:     [facility]    Received:            07/22/2019 1702                                     Medicine                                                                     First Screen:          Bisi Altamirano CT                                                                               (ASCP)                                                                       Specimen:    SUREPATH PAP, SCREENING, Endocervical/cervical                                             Interpretation  Negative for squamous intraepithelial lesion or malignancy.      Negative for squamous intraepithelial lesion or malignancy    Result Flag Normal Normal    Specimen Adequacy       Satisfactory for evaluation, endocervical/transformation zone component present    HPV Reflex? Yes regardless of result     HIGH RISK No     LMP/Menopause Date aug 2018     Abnormal Bleeding No     Pt Status post partum     Birth Control/Hormones None     Previous Normal/Date 2018     Prev Abn Date/Dx no     Cervical Appearance nl    HPV High Risk DNA Cervical   Result Value Ref Range    HPV Source SurePath     HPV16 DNA Negative NEG    HPV18 DNA Negative NEG    Other HR HPV Negative NEG    Final Diagnosis SEE NOTES       Comment:      This patient's sample is  negative for HPV DNA.  This test was developed and its performance characteristics determined by the  [facility], [facility]. It  has not been cleared or approved by the FDA. The laboratory is regulated under  CLIA as qualified to perform high-complexity testing. This test is used for  clinical purposes. It should not be regarded as investigational or for  research.  (Note)  METHODOLOGY:  The Roche andriy 4800 system uses automated extraction,  simultaneous amplification of HPV (L1 region) and beta-globin,  followed by  real time detection of fluorescent labeled HPV and beta  globin using specific oligonucleotide probes . The test specifically  identifies types HPV 16 DNA and HPV 18 DNA while concurrently  detecting the rest of the high risk types (31, 33, 35, 39, 45, 51,  52, 56, 58, 59, 66 or 68).    COMMENTS:  This test is not intended for use as a screening device  for women under age 30 with normal cervical   cytology.  Results should  be correlated with cytologic and histologic findings. Close clinical  followup is recommended.        Specimen Description Cervical Cells       Comment:      Performed and/or entered by:  [address] [id number]          Please call with questions or contact us using [employer].    Sincerely,        Electronically signed by Ana Estrada MD

## 2021-06-19 NOTE — LETTER
Letter by Ana Estrada MD at      Author: Ana Estrada MD Service: -- Author Type: --    Filed:  Encounter Date: 7/28/2019 Status: (Other)         Ambrose D eLa Rosa  [address] [facility] [address]             July 28, 2019         Dear Ms. De La Rosa,    Below are the results from your recent visit:    Resulted Orders   Hemoglobin   Result Value Ref Range    Hemoglobin 13.3 12.0 - 16.0 g/dL   HPV High Risk DNA Cervical   Result Value Ref Range    HPV Source SurePath     HPV16 DNA Negative NEG    HPV18 DNA Negative NEG    Other HR HPV Negative NEG    Final Diagnosis SEE NOTES       Comment:      This patient's sample is negative for HPV DNA.  This test was developed and its performance characteristics determined by the  [facility], [facility]. It  has not been cleared or approved by the FDA. The laboratory is regulated under  CLIA as qualified to perform high-complexity testing. This test is used for  clinical purposes. It should not be regarded as investigational or for  research.  (Note)  METHODOLOGY:  The Roche andriy 4800 system uses automated extraction,  simultaneous amplification of HPV (L1 region) and beta-globin,  followed by  real time detection of fluorescent labeled HPV and beta  globin using specific oligonucleotide probes . The test specifically  identifies types HPV 16 DNA and HPV 18 DNA while concurrently  detecting the rest of the high risk types (31, 33, 35, 39, 45, 51,  52, 56, 58, 59, 66 or 68).    COMMENTS:  This test is not intended for use as a screening device  for women under age 30 with normal cervical   cytology.  Results should  be correlated with cytologic and histologic findings. Close clinical  followup is recommended.        Specimen Description Cervical Cells       Comment:      Performed and/or entered by:  [address] [address]      Pap is normal and labs are also  normal.    Please call with questions or contact us using [employer].    Sincerely,        Electronically signed by Ana Estrada MD

## 2021-06-21 NOTE — PROGRESS NOTES
Assessment/Plan:       1. 10 weeks gestation of pregnancy  Congratulated patient on her planned pregnancy.  Sent prescription for prenatal vitamins to her pharmacy.  Also ordered early ultrasound to confirm dates and viability, dates correlate nicely.  Based on her LMP, she is 10 weeks 1 day gestation today with an EDC of 5/28/19.  She will schedule her first OB visit at her earliest convenience and we will assist her in arranging this.  - Pregnancy, Urine  - US OB < 14 Weeks    2. Need for vaccination  - Influenza, Seasonal Quad, Preservative Free 36+ Months        Subjective:       Ambrose De La Rosa is a 33 y.o. female who presents for confirmation.  This is a planned and desired pregnancy.  This is a short pregnancy interval as her last delivery occurred on 10/2/17 and was a normal spontaneous vaginal delivery.  Her last menstrual period occurred on 8/21/18 and she is sure about this date.  She was having regular menses prior.  She has not been breast-feeding.  This is her third pregnancy, she has never had a miscarriage.  She has had 2 previous vaginal deliveries.  She reports that she has been feeling well, has no nausea, no fatigue, no leakage of fluid or bleeding.  She has no particular questions or concerns today.  She has not been taking a prenatal vitamin.    The following portions of the patient's history were reviewed and updated as appropriate: allergies, current medications, past medical history, past social history, past surgical history and problem list.      Current Outpatient Prescriptions:      ibuprofen (ADVIL,MOTRIN) 200 MG tablet, Take 3 tablets (600 mg total) by mouth every 8 (eight) hours as needed for pain., Disp: 100 tablet, Rfl: 0     PNV with calcium no.72-iron-FA (PRENATAL PLUS, CALCIUM CARB,) 27 mg iron- 1 mg Tab, Take 1 tablet by mouth daily., Disp: 90 tablet, Rfl: 3    Review of Systems   Pertinent items are noted in HPI.      Objective:      BP 96/60 (Patient Site: Left Arm, Patient  "Position: Sitting, Cuff Size: Adult Regular)  Pulse 80  Resp 20  Ht 4' 8.25\" (1.429 m)  Wt 120 lb 1.6 oz (54.5 kg)  LMP 08/21/2018  Breastfeeding? No  BMI 26.69 kg/m2    General appearance: alert, appears stated age and cooperative  Due to the nature of the visit, no further examination is performed today.        Results for orders placed or performed in visit on 10/31/18   Pregnancy, Urine   Result Value Ref Range    Pregnancy Test, Urine Positive (!) Negative            "

## 2021-06-21 NOTE — PROGRESS NOTES
PRENATAL VISIT   FIRST OBSTETRICAL EXAM - OB    Assessment / Plan     Encounter for supervision of other normal pregnancy in first trimester  Normal first prenatal visit at 12w3d.  Discussed orientation, general information, lifestyle, nutrition, exercise,warning signs, resources, lab testing and risk screening with patient.  Questions answered.  Initial labs drawn.  Prenatal vitamins.  Problem list reviewed and updated.  Genetic screening test options discussed:  Patient elects to decline all testing  Role of ultrasound in pregnancy discussed; fetal survey: requested.  Follow up: Return in 4 weeks (on 2018).  - ABO/RH Typing (OP order)  - Hepatitis B Surface antigen (HBsAG)  - HIV Antigen/Antibody Screening Cascade  - HM1(CBC and Differential)  - HML Antibody Screen  - RPR  - Rubella Immune Status (IgG)  - Urinalysis Macroscopic  - Culture, Urine  - Chlamydia/gonorrhoeae CERVIX  - Pap Smear  - HM1 (CBC with Diff)       Subjective:    Ambrose De La Rosa is a 33 y.o.  here today for her First Obstetrical Exam.     Patient reports she feels well.  She denies any nausea with this pregnancy.  She denies leakage of fluid or bleeding, has not had any significant cramping.  Her one question today is that of her prenatal vitamin.  It seems to turn her urine a dark yellow color.    OB History    Para Term  AB Living   3 2 2     2   SAB TAB Ectopic Multiple Live Births         0 2      # Outcome Date GA Lbr Minesh/2nd Weight Sex Delivery Anes PTL Lv   3 Current            2 Term 10/02/17 40w3d 03:40 / 00:05 6 lb 1.7 oz (2.77 kg) F Vag-Spont None N SADAF      Complications: Fetal Intolerance   1 Term 16 39w5d 42:50 / 00:39 6 lb 11 oz (3.033 kg) F Vag-Spont Inhalation, Local N SADAF          Expected Date of Delivery: 2019, by Last Menstrual Period    Past Medical History:   Diagnosis Date      (normal spontaneous vaginal delivery)      Spontaneous vaginal delivery      History reviewed. No pertinent  "surgical history.  Social History     Tobacco Use     Smoking status: Never Smoker     Smokeless tobacco: Never Used   Substance Use Topics     Alcohol use: No     Drug use: No     Current Outpatient Medications   Medication Sig Dispense Refill     PNV,calcium 72-iron-folic acid (PRENATAL PLUS, CALCIUM CARB,) 27 mg iron- 1 mg Tab Take 1 tablet by mouth daily. 90 tablet 3     ibuprofen (ADVIL,MOTRIN) 200 MG tablet Take 3 tablets (600 mg total) by mouth every 8 (eight) hours as needed for pain. 100 tablet 0     No current facility-administered medications for this visit.      No Known Allergies          High Risk Behavior: Last birth within 1 year    Review of Systems  General:  Denies problem  Eyes: Denies problem  Ears/Nose/Throat: Denies problem  Cardiovascular: Denies problem  Respiratory:  Denies problem  Gastrointestinal:  Denies problem  Genitourinary: Denies problem  Musculoskeletal:  Denies problem  Skin: Denies problem  Neurologic: Denies problem  Psychiatric: Denies problem  Endocrine: Denies problem  Heme/Lymphatic: Denies problem   Allergic/Immunologic: Denies problem       Objective:   Objective    Vitals:    11/16/18 1352   BP: 102/60   Pulse: 64   Weight: 120 lb 8 oz (54.7 kg)   Height: 4' 8.25\" (1.429 m)     Physical Exam:  General Appearance: Alert, cooperative, no distress, appears stated age  Head: Normocephalic, without obvious abnormality, atraumatic  Eyes: PERRL, conjunctiva/corneas clear, EOM's intact  Ears: Normal TM's and external ear canals, both ears  Nose: Nares normal, septum midline, mucosa normal, no drainage  Throat: Lips, mucosa, and tongue normal; teeth and gums normal  Neck: Supple, symmetrical, trachea midline, no adenopathy; thyroid: not enlarged, symmetric, no tenderness/mass/nodules  Back: Symmetric, no curvature, ROM normal, no CVA tenderness  Lungs: Clear to auscultation bilaterally, respirations unlabored  Breasts: No breast masses, tenderness, asymmetry, or nipple " discharge  Heart: Regular rate and rhythm, S1 and S2 normal, no murmur, rub, or gallop  Abdomen: Soft, non-tender, bowel sounds active all four quadrants,  no masses, no organomegaly  Pelvic: Normally developed genitalia with no external lesions or eruptions. Vagina and cervix show no lesions, inflammation, discharge or tenderness. No cystocele, No rectocele. Uterus normal for gestation.  No adnexal mass or tenderness.  Extremities: Extremities normal, atraumatic, no cyanosis or edema  Skin: Skin color, texture, turgor normal, no rashes or lesions  Lymph nodes: Cervical, supraclavicular, and axillary nodes normal  Neurologic: Normal     Lab:   Results for orders placed or performed in visit on 11/16/18   Culture, Urine   Result Value Ref Range    Culture No Growth    Chlamydia/gonorrhoeae CERVIX   Result Value Ref Range    Chlamydia trachomatis, Amplified Detection Negative Negative    Neisseria gonorrhoeae, Amplified Detection Negative Negative   ABO/RH Typing (OP order)   Result Value Ref Range    HML ABO/Rh Typing B POS     HML ABO/Rh Repeat Typing B POS    Hepatitis B Surface antigen (HBsAG)   Result Value Ref Range    Hepatitis B Surface Ag Negative Negative   HIV Antigen/Antibody Screening Cascade   Result Value Ref Range    HIV Antigen / Antibody Negative Negative   HML Antibody Screen   Result Value Ref Range    HML Antibody Screen Negative Negative   RPR   Result Value Ref Range    Treponema Antibody (Syphilis) Negative Negative   Rubella Immune Status (IgG)   Result Value Ref Range    Rubella Antibody, IgG Positive    Urinalysis Macroscopic   Result Value Ref Range    Color, UA Yellow Colorless, Yellow, Straw, Light Yellow    Clarity, UA Clear Clear    Glucose, UA Negative Negative    Bilirubin, UA Negative Negative    Ketones, UA Negative Negative    Specific Gravity, UA 1.015 1.005 - 1.030    Blood, UA Negative Negative    pH, UA 7.5 5.0 - 8.0    Protein, UA Negative Negative mg/dL    Urobilinogen, UA  0.2 E.U./dL 0.2 E.U./dL, 1.0 E.U./dL    Nitrite, UA Negative Negative    Leukocytes, UA Moderate (!) Negative   HM1 (CBC with Diff)   Result Value Ref Range    WBC 8.8 4.0 - 11.0 thou/uL    RBC 4.17 3.80 - 5.40 mill/uL    Hemoglobin 12.9 12.0 - 16.0 g/dL    Hematocrit 39.1 35.0 - 47.0 %    MCV 94 80 - 100 fL    MCH 30.9 27.0 - 34.0 pg    MCHC 33.0 32.0 - 36.0 g/dL    RDW 12.5 11.0 - 14.5 %    Platelets 237 140 - 440 thou/uL    MPV 9.5 8.5 - 12.5 fL    Neutrophils % 64 50 - 70 %    Lymphocytes % 27 20 - 40 %    Monocytes % 7 2 - 10 %    Eosinophils % 2 0 - 6 %    Basophils % 0 0 - 2 %    Neutrophils Absolute 5.6 2.0 - 7.7 thou/uL    Lymphocytes Absolute 2.3 0.8 - 4.4 thou/uL    Monocytes Absolute 0.6 0.0 - 0.9 thou/uL    Eosinophils Absolute 0.2 0.0 - 0.4 thou/uL    Basophils Absolute 0.0 0.0 - 0.2 thou/uL   Pap Smear   Result Value Ref Range    Case Report       Gynecologic Cytology Report                       Case: [id number]                                   Authorizing Provider:  Genoveva Louis MD    Collected:           11/16/2018 1431              Ordering Location:     [facility]    Received:            11/16/2018 1435                                     Medicine                                                                     First Screen:          KEILA Bennett                                                                            (ASCP)                                                                       Rescreen:              KEILA Rizvi                                                                                (ASCP)                                                                       Specimen:    SUREPATH PAP, SCREENING, Endocervical/cervical                                             Interpretation  Negative for squamous intraepithelial lesion or malignancy.      Negative for squamous intraepithelial lesion or malignancy    Result Flag Normal Normal     Specimen Adequacy       Satisfactory for evaluation, endocervical/transformation zone component present    HPV Reflex? Yes if ASCUS     HIGH RISK No     LMP/Menopause Date 8/21/18     Abnormal Bleeding No     Pt Status Pregnant     Birth Control/Hormones None     Previous Normal/Date unknown     Prev Abn Date/Dx 12/17/15 - ASCUS, HPV neg     Cervical Appearance normal pregnant

## 2021-06-22 NOTE — PROGRESS NOTES
Patient has been doing well, she is starting to feel some fetal movement.  She denies any leakage of fluid or bleeding.  She brings in FMLA forms and short-term disability forms today.  We will complete these and fax for her.  She plans 12 weeks off postpartum.  She has been feeling well and has no other questions or concerns today.  Fetal survey ultrasound ordered and will assist patient in scheduling this.  Plan follow-up in 4 weeks.

## 2021-06-23 NOTE — TELEPHONE ENCOUNTER
----- Message from Genoveva Louis MD sent at 1/14/2019  1:26 PM CST -----  Please call patient (speaks Hmong):  The fetal survey ultrasound looked great!  Normal growth, fluid level.  Let me know if questions prior to your next visit.  GENE Louis MD

## 2021-06-23 NOTE — PROGRESS NOTES
Patient continues to feel well.  She is feeling good fetal movement, denies leakage of fluid or bleeding.  Discussed 1 hr GCT next visit, plan follow-up in 4 weeks.

## 2021-06-23 NOTE — PATIENT INSTRUCTIONS - HE
"  Patient Education   HEALTHY PREGNANCY CARE: 18-22 WEEKS PREGNANT    Your baby is continuing to develop quickly. At this stage, babies can now suck their thumbs,  firmly with their hands, and are beginning to hear.    Sometime between 18 and 22 weeks, you will start to feel your baby move. At first, these small fetal movements feel like fluttering or \"butterflies.\" Some women say that they feel like gas bubbles. As the baby grows, these movements will become stronger and be able to be felt through your abdomen.     Nutrition: During this time, you may find that your nausea and fatigue are gone. Overall, you may feel better and have more energy than you did in your first trimester. Be sure you are getting enough calcium and iron in your diet. Your prenatal vitamins cannot supply all of the nutrients you need, so continue to eat 3-4 servings of dairy foods and 2-3 servings of meat/fish/poultry/nuts every day. Foods high in iron include: red meats, eggs, dark green vegetables, dark yellow vegetables, nuts, kidney beans and chickpeas. Some cereals are fortified with iron, so look at the food labels for 100% of the daily requirement for iron.     [place] for childbirth and parenting classes, including an infant CPR class. Breastfeeding classes are recommended too.    Plan for the gestational diabetes screening between weeks 24-28. You can eat normally before that visit; we would suggest making sure you have protein foods, but not a lot of carbohydrates or sugary foods.    Your blood type was determined at your first OB appointment. If you are Rh negative, you should discuss the need for a Rhogam shot with your midwife or physician.     If you had a  birth in the past, discuss a trial of labor with your midwife or physician. He or she may ask that you obtain your operative report from that  if you are wanting to have a vaginal birth after  () this time.     Think about the support you " have, and what help you can plan on from family and friends, after your baby is born. Many mothers and babies are ready to go home from the hospital within a few days. Your clinic staff is available to assist you and the [facility] staff is available to you after hours. Start preparing your other children for changes they'll experience with the new baby. Explore day care options.    You may find that you have new discomforts now, such as sleep problems or leg cramps. To access information that can help you ease these discomforts, you can refer to the Starting Out Right book or find it online at [email or web address]/images/stories/[email or web address]-Starting-Out-Right.pdf or http://www.healthWiNetworks.org/images/stories/flipbooks/[facility]-starting-out-right/[facility]-starting-out-right.html#p=8    You can sign up for a weekly parenting e-mail that gives support, tips and advice from health care professionals that starts with pregnancy and continues through the toddler years. To register, go to [email or web address] at any time during your pregnancy.    Watch for the warning signs of premature labor:     Dull, low backache    Contractions of the uterus, menstrual-like cramps    Abdominal cramping with or without diarrhea    More pelvic pressure    Increase or change in vaginal discharge.     Remember that labor doesn't have to hurt. Never hesitate to call your midwife or physician or their staff at [facility] at Phone: [phone] for any one of these warning signs - or if something just doesn't feel right. If it's after clinic hours, physician patients should call the [facility] at [phone] ([phone]); midwife patients should call their answering service at [phone].

## 2021-06-23 NOTE — TELEPHONE ENCOUNTER
Left message to call back for: Pa  Information to relay to patient:  LMTCB. Please relay message from Dr. Genoveva Louis when pt calls back

## 2021-06-24 NOTE — PROGRESS NOTES
Patient has been feeling well.  She denies leakage of fluid or bleeding, has been feeling fetal movement.  1 hr GCT being completed today.  Plan follow up in 3 weeks.

## 2021-06-24 NOTE — PATIENT INSTRUCTIONS - HE
Patient Education   HEALTHY PREGNANCY CARE: 22-26 WEEKS PREGNANT    You are finishing your second trimester. Your baby is developing rapidly. At this stage, babies have a sense of balance, can respond to touch, and are recognizing parent voices.  Your baby will be moving around more now.  You may notice Andre-Hendrickson contractions now, which are painless and prepare the uterus for the delivery.    Nutrition: During this time, you may find that your nausea and fatigue are gone. Overall, you may feel better and have more energy than you did in your first trimester. Be sure you are getting enough calcium and iron in your diet. Your prenatal vitamins cannot supply all of the nutrients you need, so continue to eat 3-4 servings of dairy foods and 2-3 servings of meat/fish/poultry/nuts every day. Foods high in iron include: red meats, eggs, dark green vegetables, dark yellow vegetables, nuts, kidney beans and chickpeas. Some cereals are fortified with iron, so look at the food labels for 100% of the daily requirement for iron.     Discuss your work situation with your midwife or physician as needed. If you stand for long periods of time, you may need to make changes and take breaks.    [place] for childbirth and parenting classes, including an infant CPR class. Breastfeeding classes are recommended too.    Plan for the gestational diabetes screening between weeks 24-28. You can eat normally before that visit; we would suggest making sure you have protein foods, but not a lot of carbohydrates or sugary foods.    Your blood type was determined at your first OB appointment. If you are Rh negative, you should discuss the need for a Rhogam shot with your midwife or physician. This would be administered around 28 weeks if necessary.    How can you care for yourself at home?   You can refer to the Starting Out Right book or find it online at [email or web address]/images/stories/maternity/[email or web address]-Starting-Out-Right.pdf or  "http://www.[employer].org/images/stories/flipbooks/[facility]-starting-out-right/[facility]-starting-out-right.html#p=8     You can sign up for a weekly parenting e-mail that gives support, tips and advice from health care professionals that starts with pregnancy and continues through the toddler years. To register, go to [email or web address] at any time during your pregnancy.    Watch for the warning signs of premature labor:   \" Dull, low backache  \" Contractions of the uterus, menstrual-like cramps  \" Abdominal cramping with or without diarrhea  \" More pelvic pressure  \" Increase or change in vaginal discharge.     Continue to watch for signs and symptoms of preeclampsia:   \" Sudden swelling of your face, hands, or feet   \" New vision problems such as blurring, double vision, or flashing lights  \" A severe headache not relieved with acetaminophen (Tylenol)  \" Sharp or stabbing pain in your right or middle upper abdomen    Remember that labor doesn't have to hurt. Never hesitate to call your midwife or physician or their staff at [facility] at Phone: [phone] for any one of these warning signs - or if something just doesn't feel right. If it's after clinic hours, physician patients should call the [facility] at [phone] ([phone]); midwife patients should call their answering service at [phone].      Baby Feeding in the Hospital: Information, Support and Resources    As you prepare for the birth of your child, you will want to consider options for feeding your baby including breast-feeding and/or baby formula. The American Academy of Pediatrics recommends exclusive breast-feeding for the first six months (although any amount of breast-feeding is beneficial).  However, we also understand that breast-feeding is a personal choice and not for everyone. Whether or not you choose to breast-feed, your decision will be respected by our staff.    There are numerous benefits of " breast-feeding; here are a few to consider:    Provides antibodies to protect your baby from infections and diseases    The cost: formula can cost over $1,500 per year    Convenience, no warming up or sterilizing bottles and supplies    The physical contact with breastfeeding can make babies feel secure, warm and comforted    What ever my feeding choice, what can I expect after I deliver my baby?    Your baby will usually be placed skin-to-skin immediately following birth. The skin to skin contact between you and your baby will be a special and memorable time. The bonding and attachment comforts your baby and has a positive effect on baby s brain development.     Having your baby  room in  with you also helps you start to learn your baby s body rhythms and sleep cycle.      You will also begin to learn your baby s cues (signals) that he or she is ready to feed.    When do I start to feed my baby?  As soon as possible after your baby s birth, you will be encouraged to begin feeding.  In the first couple of weeks, your baby will eat often.  Breastfeeding babies usually eat at least 8 times in 24 hours.  Babies fed formula usually eat at least 7 times in 24 hours.      Breast-feeding tips:    Get comfortable and use pillows for support.    Have your baby at the level of your breast, facing you,  tummy to tummy .      Touch your nipple to your baby s lips so you baby s mouth opens wide (rooting reflex).  Aim the nipple toward the roof of your baby s mouth. When your baby opens his or her mouth, pull your baby toward your breast to help your baby  latch on  to your nipple and much of the areola area.    Hand expressing your breast milk can assist with latching your baby to your breast, if needed.    Ask for help, breastfeeding may seem awkward or uncomfortable at first, this is normal. There are numerous resources available at [facility], Clinics and beyond.     If your goal is to exclusively breastfeed, avoid  using any formula or artificial nipples (including bottles and pacifiers) while you are your baby are learning to breastfeed unless there is a medical reason.       Mixing breastfeeding and formula can interfere with how you begin building your milk supply.  It can impact how you and your baby  learn  to breastfeeding together and alter the natural growth of  good  bacteria in your baby s stomach.    Delay a pacifier or a bottle in the first few weeks until breastfeeding is well established. This is often around 3 weeks of age.    Ask your nurse to show you how to hand express.   Breast milk can be kept in the refrigerator or freezer for later use.    Hospital Resources:  [facility] Clinics located at [facility], [facility] and [facility]  Call: [phone].    Inpatient support    Outpatient appointments    Telephone consultation    Breast-feeding classes available through [employer]      Online Resources:    [email or web address] sign up for free online weekly e-mail    [email or web address]    Breastfeedingmadesimple.com    Llli.org (La Leche League)    Normalfed.com    Womenshealth.gov/breastfeeding    Workandpump.com    Breast-feeding Supplies & Pumps:  Talk to your insurance provider or WIC (Women, Infants and Children) to learn more about options available to you. Recent health insurance changes may include additional coverage for supplies and pumps.    Public Health:  Women, Infants and Children Nutrition program (WIC): provides breast-feeding support and education in addition to formal feeding moms. 652-MDN-1337 or http://www.health.[facility].mn.us/divs/fh/wic    Family Health Home Visiting: Public Health Nurse home visits are available. Talk to your provider to see if you qualify. Most [facility] have a program available.    Additional Resources:  La Leche League is an international, nonprofit, nonsectarian organization offering information, education,  and support to mothers who want to breast-feed their babies. Local groups offer phone help and monthly meetings. Visit Gradeable.org or PayTouchli.org and us the  Find local support  drop down menu or click on the  Resources  tab.    [place] Breastfeeding Resources: 8-685-300-BABY (0443) toll free    National Breastfeeding Help Line trained breastfeeding peer counselors can help answer common breast-feeding questions by phone. Monday-Friday: English/Yakut  8-669- 852-6306 toll free, 1-364.787.5037 (TTY)    [facility] Connection: 183-703-[facility] (9887)

## 2021-07-14 PROBLEM — Z34.80 ENCOUNTER FOR SUPERVISION OF OTHER NORMAL PREGNANCY: Status: RESOLVED | Noted: 2017-06-21 | Resolved: 2017-12-14

## 2021-07-14 PROBLEM — Z34.90 PREGNANT: Status: RESOLVED | Noted: 2019-05-13 | Resolved: 2021-01-12

## 2021-10-27 NOTE — PROGRESS NOTES
Feeling well, no leakage of fluid or bleeding.  Reviewed fetal survey ultrasound, due to misunderstanding with specialty scheduling, was scheduled to early to complete the fetal survey.  Reordered this today and will assist patient in rescheduling.  Baby is moving well, patient is measuring mildly small.  She states she measured small with her first pregnancy as well.  Plan follow-up in 4 weeks with [facility] midwives, information given to patient who will schedule.   76F wit above pmhx now presenting for L sided AVF. Pt had right sided mastectomy with possible node dissection. However, for accesses side will be utilized. Pt reports no cardiac or respiratory issues. Need for sedation explained with backup of general anesthesia. Risk and benefits explained extensively.

## 2022-02-10 DIAGNOSIS — Z30.41 ENCOUNTER FOR SURVEILLANCE OF CONTRACEPTIVE PILLS: ICD-10-CM

## 2022-02-14 RX ORDER — NORGESTIMATE AND ETHINYL ESTRADIOL
KIT
Qty: 84 TABLET | Refills: 3 | Status: SHIPPED | OUTPATIENT
Start: 2022-02-14 | End: 2023-01-24

## 2022-02-14 NOTE — TELEPHONE ENCOUNTER
"Routing refill request to provider for review/approval because:  Patient needs to be seen because it has been more than 1 year since last office visit.    Last Written Prescription Date:  1/12/21  Last Fill Quantity: 84,  # refills: 3   Last office visit provider:  1/12/21     Requested Prescriptions   Pending Prescriptions Disp Refills     TRI-LO-SPRINTEC 0.18/0.215/0.25 MG-25 MCG tablet [Pharmacy Med Name: TRI-LO-SPRINTEC TABLETS 28S] 84 tablet 3     Sig: TAKE 1 TABLET BY MOUTH DAILY       Contraceptives Protocol Failed - 2/10/2022  3:51 AM        Failed - Recent (12 mo) or future (30 days) visit within the authorizing provider's specialty     Patient has had an office visit with the authorizing provider or a provider within the authorizing providers department within the previous 12 mos or has a future within next 30 days. See \"Patient Info\" tab in inbasket, or \"Choose Columns\" in Meds & Orders section of the refill encounter.              Passed - Patient is not a current smoker if age is 35 or older        Passed - Medication is active on med list        Passed - No active pregnancy on record        Passed - No positive pregnancy test in past 12 months             Brian Gallego RN 02/14/22 8:35 AM  "

## 2022-02-14 NOTE — TELEPHONE ENCOUNTER
Patient is due for a complete physical exam.  Please assist her with scheduling and then I will send her birth control prescription, thanks.

## 2023-01-16 DIAGNOSIS — Z30.41 ENCOUNTER FOR SURVEILLANCE OF CONTRACEPTIVE PILLS: ICD-10-CM

## 2023-01-16 NOTE — TELEPHONE ENCOUNTER
Medication Question or Refill        What medication are you calling about (include dose and sig)?: TRI-LO-SPRINTEC 0.18/0.215/0.25 MG-25 MCG tablet    Controlled Substance Agreement on file:   CSA -- Patient Level:    CSA: None found at the patient level.       Who prescribed the medication?: Dr. Louis    Do you need a refill? Yes:     When did you use the medication last? 1/16/23    Patient offered an appointment? No    Do you have any questions or concerns?  No    Preferred Pharmacy [id number] - [place], [place] - [address]  [address]  [facility] [address]  Phone: [phone] Fax: [phone]      Okay to leave a detailed message?: Yes at Cell number on file:    Telephone Information:   Mobile [phone]

## 2023-01-18 RX ORDER — NORGESTIMATE AND ETHINYL ESTRADIOL 7DAYSX3 LO
1 KIT ORAL DAILY
Qty: 84 TABLET | Refills: 0 | OUTPATIENT
Start: 2023-01-18

## 2023-01-18 NOTE — TELEPHONE ENCOUNTER
"Routing refill request to provider for review/approval because:  Patient needs to be seen because it has been more than 1 year since last office visit.    Last Written Prescription Date:  2/14/22  Last Fill Quantity: 84,  # refills: 3   Last office visit provider:  1/12/21     Requested Prescriptions   Pending Prescriptions Disp Refills     norgestim-eth estrad triphasic (TRI-LO-SPRINTEC) 0.18/0.215/0.25 MG-25 MCG tablet 84 tablet 3     Sig: Take 1 tablet by mouth daily       Contraceptives Protocol Failed - 1/18/2023  8:06 AM        Failed - Recent (12 mo) or future (30 days) visit within the authorizing provider's specialty     Patient has had an office visit with the authorizing provider or a provider within the authorizing providers department within the previous 12 mos or has a future within next 30 days. See \"Patient Info\" tab in inbasket, or \"Choose Columns\" in Meds & Orders section of the refill encounter.              Passed - Patient is not a current smoker if age is 35 or older        Passed - Medication is active on med list        Passed - No active pregnancy on record        Passed - No positive pregnancy test in past 12 months             Brian Gallego RN 01/18/23 8:06 AM  "

## 2023-01-19 NOTE — TELEPHONE ENCOUNTER
This is the 3rd time I've received this request.  I have not seen this patient for 2 years, she needs a visit if I am to continue to prescribe this for her.

## 2023-01-24 RX ORDER — NORGESTIMATE AND ETHINYL ESTRADIOL 7DAYSX3 LO
1 KIT ORAL DAILY
Qty: 28 TABLET | Refills: 0 | Status: SHIPPED | OUTPATIENT
Start: 2023-01-24 | End: 2023-02-12

## 2023-02-10 DIAGNOSIS — Z30.41 ENCOUNTER FOR SURVEILLANCE OF CONTRACEPTIVE PILLS: ICD-10-CM

## 2023-02-12 RX ORDER — NORGESTIMATE AND ETHINYL ESTRADIOL
KIT
Qty: 28 TABLET | Refills: 0 | Status: SHIPPED | OUTPATIENT
Start: 2023-02-12 | End: 2023-02-15

## 2023-02-12 NOTE — TELEPHONE ENCOUNTER
"Routing refill request to provider for review/approval because:  Patient needs to be seen because it has been more than 1 year since last office visit.    Last Written Prescription Date:  01/24/2023   Last Fill Quantity: 28,  # refills: 0   Last office visit provider:  01/12/2021     Requested Prescriptions   Pending Prescriptions Disp Refills     TRI-LO-SPRINTEC 0.18/0.215/0.25 MG-25 MCG tablet [Pharmacy Med Name: TRI-LO-SPRINTEC TABLETS 28S] 28 tablet 0     Sig: TAKE 1 TABLET BY MOUTH DAILY       Contraceptives Protocol Failed - 2/12/2023  9:04 AM        Failed - Recent (12 mo) or future (30 days) visit within the authorizing provider's specialty     Patient has had an office visit with the authorizing provider or a provider within the authorizing providers department within the previous 12 mos or has a future within next 30 days. See \"Patient Info\" tab in inbasket, or \"Choose Columns\" in Meds & Orders section of the refill encounter.              Passed - Patient is not a current smoker if age is 35 or older        Passed - Medication is active on med list        Passed - No active pregnancy on record        Passed - No positive pregnancy test in past 12 months             Lani Baig RN 02/12/23 9:05 AM  "

## 2023-02-15 ENCOUNTER — OFFICE VISIT (OUTPATIENT)
Dept: FAMILY MEDICINE | Facility: CLINIC | Age: 38
End: 2023-02-15
Payer: COMMERCIAL

## 2023-02-15 VITALS
HEART RATE: 70 BPM | RESPIRATION RATE: 20 BRPM | TEMPERATURE: 98.5 F | WEIGHT: 122.38 LBS | HEIGHT: 56 IN | OXYGEN SATURATION: 99 % | DIASTOLIC BLOOD PRESSURE: 54 MMHG | BODY MASS INDEX: 27.53 KG/M2 | SYSTOLIC BLOOD PRESSURE: 97 MMHG

## 2023-02-15 DIAGNOSIS — Z00.00 ANNUAL PHYSICAL EXAM: Primary | ICD-10-CM

## 2023-02-15 DIAGNOSIS — Z30.41 ENCOUNTER FOR SURVEILLANCE OF CONTRACEPTIVE PILLS: ICD-10-CM

## 2023-02-15 DIAGNOSIS — Z11.59 NEED FOR HEPATITIS C SCREENING TEST: ICD-10-CM

## 2023-02-15 PROCEDURE — 99395 PREV VISIT EST AGE 18-39: CPT | Performed by: FAMILY MEDICINE

## 2023-02-15 RX ORDER — NORGESTIMATE AND ETHINYL ESTRADIOL 7DAYSX3 LO
1 KIT ORAL DAILY
Qty: 84 TABLET | Refills: 4 | Status: SHIPPED | OUTPATIENT
Start: 2023-02-15 | End: 2024-05-22

## 2023-02-15 ASSESSMENT — ENCOUNTER SYMPTOMS
BREAST MASS: 0
FREQUENCY: 0
NERVOUS/ANXIOUS: 0
COUGH: 0
HEMATURIA: 0
HEADACHES: 0
JOINT SWELLING: 0
SHORTNESS OF BREATH: 0
CHILLS: 0
PARESTHESIAS: 0
ABDOMINAL PAIN: 0
DIARRHEA: 0
ARTHRALGIAS: 0
PALPITATIONS: 0
DIZZINESS: 0
HEARTBURN: 0
MYALGIAS: 0
WEAKNESS: 0
HEMATOCHEZIA: 0
NAUSEA: 0
EYE PAIN: 0
CONSTIPATION: 0
SORE THROAT: 0
DYSURIA: 0
FEVER: 0

## 2023-02-15 NOTE — PROGRESS NOTES
SUBJECTIVE:   CC: Pa is an 37 year old who presents for preventive health visit.     Chief Complaints and History of Present Illnesses   Patient presents with     Physical     Fasting for labs        Patient has been advised of split billing requirements and indicates understanding: Yes  Healthy Habits:     Getting at least 3 servings of Calcium per day:  Yes    Bi-annual eye exam:  NO    Dental care twice a year:  NO    Sleep apnea or symptoms of sleep apnea:  None    Diet:  Regular (no restrictions)    Frequency of exercise:  2-3 days/week    Duration of exercise:  15-30 minutes    Taking medications regularly:  Yes    Medication side effects:  None    PHQ-2 Total Score: 0    Additional concerns today:  No    CONTRACEPTION: would like refill today     HEALTH MAINTENANCE:   - hep C: declines   - covid: declines   - flu: declines      Today's PHQ-2 Score:   PHQ-2 ( 1999 Pfizer) 2/15/2023   Q1: Little interest or pleasure in doing things 0   Q2: Feeling down, depressed or hopeless 0   PHQ-2 Score 0   Q1: Little interest or pleasure in doing things Not at all   Q2: Feeling down, depressed or hopeless Not at all   PHQ-2 Score 0       Have you ever done Advance Care Planning? (For example, a Health Directive, POLST, or a discussion with a medical provider or your loved ones about your wishes): No, advance care planning information given to patient to review.  Patient declined advance care planning discussion at this time.    Social History     Tobacco Use     Smoking status: Never     Smokeless tobacco: Never   Substance Use Topics     Alcohol use: No     If you drink alcohol do you typically have >3 drinks per day or >7 drinks per week? No    Alcohol Use 2/15/2023   Prescreen: >3 drinks/day or >7 drinks/week? Not Applicable       Reviewed orders with patient.  Reviewed health maintenance and updated orders accordingly - Yes    Breast Cancer Screening:    Patient under 40 years of age: Routine Mammogram Screening not  recommended.   Pertinent mammograms are reviewed under the imaging tab.    History of abnormal Pap smear: NO - age 30-65 PAP every 5 years with negative HPV co-testing recommended  PAP / HPV Latest Ref Rng & Units 7/22/2019 11/16/2018 12/17/2015   PAP Negative for squamous intraepithelial lesion or malignancy. Negative for squamous intraepithelial lesion or malignancy  Electronically signed by Bisi Altamirano CT (ASCP) on 7/29/2019 at 11:00 AM   Negative for squamous intraepithelial lesion or malignancy  Electronically signed by Bisi Altamirano CT (ASCP) on 11/20/2018 at 11:25 AM   Atypical squamous cells of undetermined significance  Electronically signed by Meghan Landry MD on 12/29/2015 at 11:57 AM     HPV16 NEG Negative - -   HPV18 NEG Negative - -   HRHPV NEG Negative - -     Reviewed and updated as needed this visit by clinical staff   Tobacco  Allergies  Meds              Reviewed and updated as needed this visit by Provider   Tobacco  Allergies  Meds  Problems  Med Hx  Surg Hx  Fam Hx             Review of Systems   Constitutional: Negative for chills and fever.   HENT: Negative for congestion, ear pain, hearing loss and sore throat.    Eyes: Negative for pain and visual disturbance.   Respiratory: Negative for cough and shortness of breath.    Cardiovascular: Negative for chest pain, palpitations and peripheral edema.   Gastrointestinal: Negative for abdominal pain, constipation, diarrhea, heartburn, hematochezia and nausea.   Breasts:  Negative for tenderness, breast mass and discharge.   Genitourinary: Negative for dysuria, frequency, genital sores, hematuria, pelvic pain, urgency, vaginal bleeding and vaginal discharge.   Musculoskeletal: Negative for arthralgias, joint swelling and myalgias.   Skin: Negative for rash.   Neurological: Negative for dizziness, weakness, headaches and paresthesias.   Psychiatric/Behavioral: Negative for mood changes. The patient is not nervous/anxious.   "      OBJECTIVE:   BP 97/54 (BP Location: Left arm, Patient Position: Sitting, Cuff Size: Adult Regular)   Pulse 70   Temp 98.5  F (36.9  C) (Oral)   Resp 20   Ht 1.429 m (4' 8.25\")   Wt 55.5 kg (122 lb 6 oz)   LMP  (LMP Unknown)   SpO2 99%   BMI 27.19 kg/m    Physical Exam  GENERAL: healthy, alert and no distress  EYES: Eyes grossly normal to inspection, PERRL and conjunctivae and sclerae normal  HENT: ear canals and TM's normal, nose and mouth without ulcers or lesions  NECK: no adenopathy, no asymmetry, masses, or scars and thyroid normal to palpation  RESP: lungs clear to auscultation - no rales, rhonchi or wheezes  BREAST: normal without masses, tenderness or nipple discharge and no palpable axillary masses or adenopathy  CV: regular rate and rhythm, normal S1 S2, no S3 or S4, no murmur, click or rub, no peripheral edema and peripheral pulses strong  ABDOMEN: soft, nontender, no hepatosplenomegaly, no masses and bowel sounds normal  MS: no gross musculoskeletal defects noted, no edema  SKIN: no suspicious lesions or rashes  NEURO: Normal strength and tone, mentation intact and speech normal  PSYCH: mentation appears normal, affect normal/bright        ASSESSMENT/PLAN:     1. Annual physical exam  Reviewed health history and health maintenance recommendations.  Declines hepatitis C screening, flu shot, or COVID shot today.  She is up-to-date on her Pap smear.  Blood pressure is normal.  BMI mildly elevated but stable, continue healthy lifestyle modifications.    2. Encounter for surveillance of contraceptive pills  - norgestim-eth estrad triphasic (TRI-LO-SPRINTEC) 0.18/0.215/0.25 MG-25 MCG tablet; Take 1 tablet by mouth daily  Dispense: 84 tablet; Refill: 4    No contraindications to continuing OCPs.  Refill sent to the pharmacy for the next year.    3. Need for hepatitis C screening test  Patient declines screening, declines any risk factors.      COUNSELING:  Reviewed preventive health counseling, as " "reflected in patient instructions      BMI:   Estimated body mass index is 27.19 kg/m  as calculated from the following:    Height as of this encounter: 1.429 m (4' 8.25\").    Weight as of this encounter: 55.5 kg (122 lb 6 oz).   Weight management plan: Discussed healthy diet and exercise guidelines      She reports that she has never smoked. She has never used smokeless tobacco.      Whitney Espinal, [facility]  "

## 2023-02-15 NOTE — PATIENT INSTRUCTIONS

## 2023-03-16 ENCOUNTER — TELEPHONE (OUTPATIENT)
Dept: FAMILY MEDICINE | Facility: CLINIC | Age: 38
End: 2023-03-16
Payer: COMMERCIAL

## 2023-03-16 DIAGNOSIS — Z30.41 ENCOUNTER FOR SURVEILLANCE OF CONTRACEPTIVE PILLS: ICD-10-CM

## 2023-03-16 RX ORDER — NORGESTIMATE AND ETHINYL ESTRADIOL
KIT
Qty: 28 TABLET | OUTPATIENT
Start: 2023-03-16

## 2023-03-16 NOTE — TELEPHONE ENCOUNTER
Medication Question or Refill    Contacts       Type Contact Phone/Fax    03/16/2023 05:35 PM CDT Phone (Incoming) Tamiko Lucia (Emergency Contact) [phone]          What medication are you calling about (include dose and sig)?: norgestim-eth estrad triphasic (TRI-LO-SPRINTEC) 0.18/0.215/0.25 MG-25 MCG tablet    Preferred Pharmacy:   [facility] #[id number] - [place] [address]  [facility] [address]  Phone: [phone] Fax: [phone]      Controlled Substance Agreement on file:   CSA -- Patient Level:    CSA: None found at the patient level.       Who prescribed the medication?: Whitney Espinal, DO    Do you need a refill? Yes    When did you use the medication last? Today    Patient offered an appointment? No    Do you have any questions or concerns?  No      Okay to leave a detailed message?: Yes at Cell number on file:    Telephone Information:   Mobile [phone]   This is spouse's cell phone

## 2023-03-17 NOTE — TELEPHONE ENCOUNTER
Called and talked with [facility] pharmacy. There was a computer issue that was showing an old script. They updated this and will contact patient when refill is ready.    Thomas Machado RN     [facility]

## 2024-01-16 ENCOUNTER — PATIENT OUTREACH (OUTPATIENT)
Dept: CARE COORDINATION | Facility: CLINIC | Age: 39
End: 2024-01-16
Payer: COMMERCIAL

## 2024-01-30 ENCOUNTER — PATIENT OUTREACH (OUTPATIENT)
Dept: CARE COORDINATION | Facility: CLINIC | Age: 39
End: 2024-01-30
Payer: COMMERCIAL

## 2024-02-18 DIAGNOSIS — Z30.41 ENCOUNTER FOR SURVEILLANCE OF CONTRACEPTIVE PILLS: ICD-10-CM

## 2024-02-19 RX ORDER — NORGESTIMATE AND ETHINYL ESTRADIOL 7DAYSX3 LO
1 KIT ORAL DAILY
Qty: 84 TABLET | Refills: 4 | OUTPATIENT
Start: 2024-02-19

## 2024-05-21 LAB
ABO/RH(D): NORMAL
ANTIBODY SCREEN: NEGATIVE
SPECIMEN EXPIRATION DATE: NORMAL

## 2024-05-22 ENCOUNTER — HOSPITAL ENCOUNTER (OUTPATIENT)
Dept: ULTRASOUND IMAGING | Facility: HOSPITAL | Age: 39
Discharge: HOME | End: 2024-05-22
Attending: FAMILY MEDICINE | Admitting: FAMILY MEDICINE
Payer: COMMERCIAL

## 2024-05-22 ENCOUNTER — PRENATAL OFFICE VISIT (OUTPATIENT)
Dept: FAMILY MEDICINE | Facility: CLINIC | Age: 39
End: 2024-05-22
Payer: COMMERCIAL

## 2024-05-22 VITALS
HEART RATE: 77 BPM | SYSTOLIC BLOOD PRESSURE: 98 MMHG | OXYGEN SATURATION: 98 % | RESPIRATION RATE: 16 BRPM | WEIGHT: 127 LBS | HEIGHT: 56 IN | DIASTOLIC BLOOD PRESSURE: 55 MMHG | BODY MASS INDEX: 28.57 KG/M2

## 2024-05-22 DIAGNOSIS — Z13.1 SCREENING FOR DIABETES MELLITUS: ICD-10-CM

## 2024-05-22 DIAGNOSIS — Z34.82 ENCOUNTER FOR SUPERVISION OF OTHER NORMAL PREGNANCY IN SECOND TRIMESTER: ICD-10-CM

## 2024-05-22 DIAGNOSIS — O09.522 MULTIGRAVIDA OF ADVANCED MATERNAL AGE IN SECOND TRIMESTER: ICD-10-CM

## 2024-05-22 DIAGNOSIS — Z34.82 ENCOUNTER FOR SUPERVISION OF OTHER NORMAL PREGNANCY IN SECOND TRIMESTER: Primary | ICD-10-CM

## 2024-05-22 DIAGNOSIS — R73.09 ELEVATED GLUCOSE: ICD-10-CM

## 2024-05-22 PROBLEM — Z30.41 ENCOUNTER FOR SURVEILLANCE OF CONTRACEPTIVE PILLS: Status: RESOLVED | Noted: 2023-02-15 | Resolved: 2024-05-22

## 2024-05-22 LAB
ABO (EXTERNAL): NORMAL
ERYTHROCYTE [DISTWIDTH] IN BLOOD BY AUTOMATED COUNT: 13.2 % (ref 10–15)
GROUP B STREPTOCOCCUS (EXTERNAL): POSITIVE
HCT VFR BLD AUTO: 33.7 % (ref 35–47)
HEMOGLOBIN (EXTERNAL): 11.4 G/DL (ref 10.5–15)
HEPATITIS B SURFACE ANTIGEN (EXTERNAL): NONREACTIVE
HGB BLD-MCNC: 11.4 G/DL (ref 11.7–15.7)
HIV1+2 AB SERPL QL IA: NONREACTIVE
MCH RBC QN AUTO: 31.7 PG (ref 26.5–33)
MCHC RBC AUTO-ENTMCNC: 33.8 G/DL (ref 31.5–36.5)
MCV RBC AUTO: 94 FL (ref 78–100)
PLATELET # BLD AUTO: 181 10E3/UL (ref 150–450)
PLATELET COUNT (EXTERNAL): 181 10E3/UL (ref 140–440)
RBC # BLD AUTO: 3.6 10E6/UL (ref 3.8–5.2)
RH (EXTERNAL): POSITIVE
RUBELLA ANTIBODY IGG (EXTERNAL): NORMAL
VDRL (SYPHILIS) (EXTERNAL): NONREACTIVE
WBC # BLD AUTO: 7.9 10E3/UL (ref 4–11)

## 2024-05-22 PROCEDURE — 83036 HEMOGLOBIN GLYCOSYLATED A1C: CPT | Performed by: FAMILY MEDICINE

## 2024-05-22 PROCEDURE — 87086 URINE CULTURE/COLONY COUNT: CPT | Performed by: FAMILY MEDICINE

## 2024-05-22 PROCEDURE — 86780 TREPONEMA PALLIDUM: CPT | Performed by: FAMILY MEDICINE

## 2024-05-22 PROCEDURE — 36415 COLL VENOUS BLD VENIPUNCTURE: CPT | Performed by: FAMILY MEDICINE

## 2024-05-22 PROCEDURE — 86803 HEPATITIS C AB TEST: CPT | Performed by: FAMILY MEDICINE

## 2024-05-22 PROCEDURE — 86762 RUBELLA ANTIBODY: CPT | Performed by: FAMILY MEDICINE

## 2024-05-22 PROCEDURE — 87088 URINE BACTERIA CULTURE: CPT | Performed by: FAMILY MEDICINE

## 2024-05-22 PROCEDURE — 87389 HIV-1 AG W/HIV-1&-2 AB AG IA: CPT | Performed by: FAMILY MEDICINE

## 2024-05-22 PROCEDURE — 99213 OFFICE O/P EST LOW 20 MIN: CPT | Performed by: FAMILY MEDICINE

## 2024-05-22 PROCEDURE — 87491 CHLMYD TRACH DNA AMP PROBE: CPT | Performed by: FAMILY MEDICINE

## 2024-05-22 PROCEDURE — 86901 BLOOD TYPING SEROLOGIC RH(D): CPT | Performed by: FAMILY MEDICINE

## 2024-05-22 PROCEDURE — 87591 N.GONORRHOEAE DNA AMP PROB: CPT | Performed by: FAMILY MEDICINE

## 2024-05-22 PROCEDURE — 87340 HEPATITIS B SURFACE AG IA: CPT | Performed by: FAMILY MEDICINE

## 2024-05-22 PROCEDURE — 85027 COMPLETE CBC AUTOMATED: CPT | Performed by: FAMILY MEDICINE

## 2024-05-22 PROCEDURE — 86850 RBC ANTIBODY SCREEN: CPT | Performed by: FAMILY MEDICINE

## 2024-05-22 PROCEDURE — 82947 ASSAY GLUCOSE BLOOD QUANT: CPT | Performed by: FAMILY MEDICINE

## 2024-05-22 PROCEDURE — 86900 BLOOD TYPING SEROLOGIC ABO: CPT | Performed by: FAMILY MEDICINE

## 2024-05-22 PROCEDURE — 76805 OB US >/= 14 WKS SNGL FETUS: CPT

## 2024-05-22 RX ORDER — PRENATAL VIT/IRON FUM/FOLIC AC 27MG-0.8MG
1 TABLET ORAL DAILY
Qty: 90 TABLET | Refills: 3 | Status: SHIPPED | OUTPATIENT
Start: 2024-05-22

## 2024-05-22 NOTE — PROGRESS NOTES
"  SUBJECTIVE:     HPI:    This is a 38 year old female patient,  who presents for her first obstetrical visit.    MELISSA: 10/22/2024, by Last Menstrual Period.  She is 18w1d weeks.  Her cycles are regular.  Her last menstrual period was normal.   Since her LMP, she has had no complaints).   She denies nausea, abdominal pain, and vaginal bleeding.    Additional History:     3 girls, ages 5, 6, and 8  Vaginal deliveries all at term    Have you travelled during the pregnancy?No  Have your sexual partner(s) travelled during the pregnancy?No    HISTORY:   Planned Pregnancy: Yes  Marital Status: Single  Occupation: works at home  Living in Household: Spouse and Children and Other:  parents    Past History:  Her past medical history is non-contributory.      OB History    Para Term  AB Living   4 3 3 0 0 3   SAB IAB Ectopic Multiple Live Births   0 0 0 0 3      # Outcome Date GA Lbr Minesh/2nd Weight Sex Type Anes PTL Lv   4 Current            3 Term 19 37w6d 01:21 / 00:09 3.09 kg (6 lb 13 oz) F Vag-Spont None N SADAF      Name: ISA EJSUS-PA      Apgar1: 9  Apgar5: 9   2 Term 10/02/17 40w3d 03:40 / 00:05 2.77 kg (6 lb 1.7 oz) F Vag-Spont None N SADAF      Complications: Fetal Intolerance      Name: ANN MARIEFEMALE-PA      Apgar1: 9  Apgar5: 10   1 Term 16 39w5d 42:50 / 00:39 3.033 kg (6 lb 11 oz) F Vag-Spont Local, Nitrous N SADAF      Name: Noemi      Apgar1: 8  Apgar5: 9       Since her last LMP she denies use of alcohol, tobacco and street drugs.    Past medical, surgical, social and family history were reviewed and updated in [facility].    Current Outpatient Medications   Medication Sig Dispense Refill    Prenatal Vit-Fe Fumarate-FA (PRENATAL MULTIVITAMIN W/IRON) 27-0.8 MG tablet Take 1 tablet by mouth daily 90 tablet 3     No current facility-administered medications for this visit.       OBJECTIVE:     EXAM:  BP 98/55   Pulse 77   Resp 16   Ht 1.429 m (4' 8.25\")   Wt 57.6 kg (127 lb)   LMP " 2024   SpO2 98%   BMI 28.22 kg/m   Body mass index is 28.22 kg/m .    GENERAL: alert and no distress  RESP: lungs clear to auscultation - no rales, rhonchi or wheezes  CV: regular rate and rhythm, normal S1 S2, no S3 or S4, no murmur and then  PSYCH: mentation appears normal, affect normal/bright  Abd: Fundus below level of umbilicus, fetal heart rate 145    ASSESSMENT/PLAN:       ICD-10-CM    1. Encounter for supervision of other normal pregnancy in second trimester  Z34.82 ABO/Rh type and screen     Hepatitis B surface antigen     CBC with platelets     HIV Antigen Antibody Combo     Rubella Antibody IgG     Treponema Abs w Reflex to RPR and Titer     Urine Culture Aerobic Bacterial     Chlamydia trachomatis PCR     Neisseria gonorrhoeae PCR     Hepatitis C antibody     US OB > 14 Weeks     Prenatal Vit-Fe Fumarate-FA (PRENATAL MULTIVITAMIN W/IRON) 27-0.8 MG tablet     [facility] Non-Invasive Prenatal Screening-Prequel      2. Multigravida of advanced maternal age in second trimester  O09.522 [facility] Non-Invasive Prenatal Screening-Prequel          38 year old , 18w1d weeks of pregnancy with MELISSA of 10/22/2024, by Last Menstrual Period.  AMA with late prenatal care, dating/fetal survey ultrasound ordered.  Menses previously regular, so likely keep LMP due date unless wide discrepancy.  Discussed importance of beginning prenatal vitamin, sent to pharmacy.  Interested in NIPT screen without gender.  Other initial OB labs drawn today.  Declined Pap smear, plan to do this postpartum.  Declined COVID booster.    Counseling given:   - Follow up in 4 weeks for return OB visit, scheduled.  - Recommended weight gain for pregnancy: 15-25 lbs.      Jennifer Marie DO

## 2024-05-23 LAB
C TRACH DNA SPEC QL NAA+PROBE: NEGATIVE
GLUCOSE SERPL-MCNC: 111 MG/DL (ref 70–99)
HBV SURFACE AG SERPL QL IA: NONREACTIVE
HCV AB SERPL QL IA: NONREACTIVE
HIV 1+2 AB+HIV1 P24 AG SERPL QL IA: NONREACTIVE
N GONORRHOEA DNA SPEC QL NAA+PROBE: NEGATIVE
RUBV IGG SERPL QL IA: 5.63 INDEX
RUBV IGG SERPL QL IA: POSITIVE
T PALLIDUM AB SER QL: NONREACTIVE

## 2024-05-24 ENCOUNTER — TELEPHONE (OUTPATIENT)
Dept: FAMILY MEDICINE | Facility: CLINIC | Age: 39
End: 2024-05-24
Payer: COMMERCIAL

## 2024-05-24 LAB
BACTERIA UR CULT: ABNORMAL
BACTERIA UR CULT: ABNORMAL
HBA1C MFR BLD: 5 % (ref 0–5.6)

## 2024-05-24 NOTE — TELEPHONE ENCOUNTER
Called and talked with Kamlag (), relayed all information regarding labs and recommendations from provider, Kamlag states and understanding. Patient will call to have repeat US scheduled in 2 weeks.     Amadou Stacy RN      ----- Message from Jennifer Marie DO sent at 5/24/2024 11:53 AM CDT -----  Team - please call patient with results. Patient speaks Hmong,  speaks english.      We performed several lab tests at your first OB visit and the results are shown below. Your blood type is B+. Your tests for Hepatitis B, HIV, syphilis, gonorrhea, chlamydia, and HIV were negative. Your urine test showed low counts of GBS bacteria. This does not need treatment unless you develop symptoms such as pain or frequency with urination. We will repeat this at your follow up visit. You are immune to the rubella virus. Your hemoglobin is slightly low- please begin the prenatal vitamin and focus on foods with iron to improve this level.     Your ultrasound showed baby is growing and developing appropriately. We will keep your due date at 10/22/24. They were unable to see some of the heart and face due to baby's positioning so we should repeat an ultrasound in a couple weeks. I will place the order, please call to schedule.    Jennifer Marie DO

## 2024-05-30 ENCOUNTER — TELEPHONE (OUTPATIENT)
Dept: FAMILY MEDICINE | Facility: CLINIC | Age: 39
End: 2024-05-30
Payer: COMMERCIAL

## 2024-05-30 LAB — SCANNED LAB RESULT: NORMAL

## 2024-05-30 NOTE — TELEPHONE ENCOUNTER
----- Message from Jennifer Marie DO sent at 5/30/2024 11:45 AM CDT -----  Team - please call patient with results.    Please notify patient her genetic screening was normal.    Jennifer Marie DO      Team - please call patient with results. Patient speaks Hmong,  speaks english.        We performed several lab tests at your first OB visit and the results are shown below. Your blood type is B+. Your tests for Hepatitis B, HIV, syphilis, gonorrhea, chlamydia, and HIV were negative. Your urine test showed low counts of GBS bacteria. This does not need treatment unless you develop symptoms such as pain or frequency with urination. We will repeat this at your follow up visit. You are immune to the rubella virus. Your hemoglobin is slightly low- please begin the prenatal vitamin and focus on foods with iron to improve this level.     Your ultrasound showed baby is growing and developing appropriately. We will keep your due date at 10/22/24. They were unable to see some of the heart and face due to baby's positioning so we should repeat an ultrasound in a couple weeks. I will place the order, please call to schedule.     Jennifer Marie DO

## 2024-05-31 NOTE — TELEPHONE ENCOUNTER
Patient called in (on consent to communicate), I read him the results for his spouse Pa. He had no further questions and thanked me for reading off the results. I did tell him if scheduling does not reach out in the next three days to call us for the number to get scheduled.

## 2024-06-07 ENCOUNTER — HOSPITAL ENCOUNTER (OUTPATIENT)
Dept: ULTRASOUND IMAGING | Facility: HOSPITAL | Age: 39
Discharge: HOME | End: 2024-06-07
Attending: FAMILY MEDICINE | Admitting: FAMILY MEDICINE
Payer: COMMERCIAL

## 2024-06-07 DIAGNOSIS — Z34.82 ENCOUNTER FOR SUPERVISION OF OTHER NORMAL PREGNANCY IN SECOND TRIMESTER: ICD-10-CM

## 2024-06-07 PROCEDURE — 76816 OB US FOLLOW-UP PER FETUS: CPT

## 2024-06-11 ENCOUNTER — TELEPHONE (OUTPATIENT)
Dept: FAMILY MEDICINE | Facility: CLINIC | Age: 39
End: 2024-06-11
Payer: COMMERCIAL

## 2024-06-11 NOTE — TELEPHONE ENCOUNTER
----- Message from Jennifer Marie DO sent at 6/10/2024  7:22 PM CDT -----  Team - please call patient with results.    Rest of the anatomy ultrasound was normal..    Jennifer Marie DO

## 2024-06-20 ENCOUNTER — PRENATAL OFFICE VISIT (OUTPATIENT)
Dept: FAMILY MEDICINE | Facility: CLINIC | Age: 39
End: 2024-06-20
Payer: COMMERCIAL

## 2024-06-20 VITALS
RESPIRATION RATE: 16 BRPM | SYSTOLIC BLOOD PRESSURE: 91 MMHG | WEIGHT: 130 LBS | HEART RATE: 81 BPM | OXYGEN SATURATION: 100 % | BODY MASS INDEX: 29.25 KG/M2 | HEIGHT: 56 IN | DIASTOLIC BLOOD PRESSURE: 51 MMHG | TEMPERATURE: 97.8 F

## 2024-06-20 DIAGNOSIS — O09.522 MULTIGRAVIDA OF ADVANCED MATERNAL AGE IN SECOND TRIMESTER: ICD-10-CM

## 2024-06-20 DIAGNOSIS — R82.71 GBS BACTERIURIA: ICD-10-CM

## 2024-06-20 DIAGNOSIS — Z34.82 ENCOUNTER FOR SUPERVISION OF OTHER NORMAL PREGNANCY IN SECOND TRIMESTER: Primary | ICD-10-CM

## 2024-06-20 LAB
ALBUMIN UR-MCNC: NEGATIVE MG/DL
AMORPH CRY #/AREA URNS HPF: ABNORMAL /HPF
APPEARANCE UR: ABNORMAL
BACTERIA #/AREA URNS HPF: ABNORMAL /HPF
BILIRUB UR QL STRIP: NEGATIVE
COLOR UR AUTO: YELLOW
GLUCOSE UR STRIP-MCNC: NEGATIVE MG/DL
HGB UR QL STRIP: NEGATIVE
KETONES UR STRIP-MCNC: NEGATIVE MG/DL
LEUKOCYTE ESTERASE UR QL STRIP: ABNORMAL
NITRATE UR QL: NEGATIVE
PH UR STRIP: 7 [PH] (ref 5–8)
RBC #/AREA URNS AUTO: ABNORMAL /HPF
SP GR UR STRIP: 1.02 (ref 1–1.03)
SQUAMOUS #/AREA URNS AUTO: ABNORMAL /LPF
UROBILINOGEN UR STRIP-ACNC: 0.2 E.U./DL
WBC #/AREA URNS AUTO: ABNORMAL /HPF

## 2024-06-20 PROCEDURE — 99207 PR COMPLICATED OB VISIT: CPT | Performed by: FAMILY MEDICINE

## 2024-06-20 PROCEDURE — T1013 SIGN LANG/ORAL INTERPRETER: HCPCS | Mod: U4

## 2024-06-20 PROCEDURE — 81001 URINALYSIS AUTO W/SCOPE: CPT | Performed by: FAMILY MEDICINE

## 2024-06-20 NOTE — PATIENT INSTRUCTIONS
[facility] Information  If you have any further concerns or wish to schedule another appointment, please call our office at [phone].  Call your doctor if you experience any bleeding or abdominal cramping early in pregnancy.     For uncomplicated pregnancies, you will be seeing your doctor once a month until you are 28 weeks, then you will see your doctor twice a month. You will begin weekly visits at 36 weeks until you deliver.     If you have a medical emergency, please call [phone].    [facility] Labor and Delivery: [phone]  [facility] Labor and Delivery: [phone]    When to call or come in to the hospital  If you notice a decrease in your baby's movement.   If your begin to experience contractions that are 5 minutes or less apart and lasting for over 45 seconds, or if contractions are becoming more painful.  If you have any bleeding or leakage of fluids.   If you have a headache not resolved with tylenol, right upper abdominal pain, or sudden onset of swelling.  You know your body best. Never hesitate to call or go to the hospital if something doesn't feel right!  Gestational Diabetes Screen  At your next visit, you will be screened for gestational diabetes. You will drink a sugary drink and then have your blood drawn to see how your body responds to a sugar load. This test takes about an hour to complete - please plan your schedule accordingly!  The Benefits of Breastfeeding   Breastfeeding gives your new baby the very best start. It supplies nutrition, comfort, and love. Experts agree: Breastfeeding is the healthiest choice for babies during the first year of life and beyond. It s healthy for Mom, too. Breastfeeding may be challenging at first. But with support, you and your baby can succeed together.   Healthiest for Baby   Breastmilk is the ideal food for babies. It has all the nutrients your little one needs to grow healthy and strong. Here are some of the many benefits for your baby:    Breastfeeding provides contact that babies love. Frequent skin-to-skin time with Mom is calming and comforting.   Breastmilk is full of antibodies. These are substances that help your baby fight infection. Breastmilk reduces your baby's risk of respiratory illnesses, ear infections, and diarrhea.   Breastfeeding reduces your baby s risk of allergies, colds, and many other diseases.   Breastmilk changes as your baby grows, meeting her changing needs.   Breastmilk is easy for your baby to digest.   Breastmilk contains DHA, a fat that is good for your baby s developing brain and eyes.   Breastmilk decreases the risk of sudden infant death syndrome (SIDS).  Healthiest for Mom   For many women, breastfeeding is an amazing experience. It creates a strong bond between mother and baby. Other benefits for Mom include:   You can feel proud knowing that your baby is growing healthy and strong because of your milk.   Breastmilk is convenient. It s free, clean, and always the right temperature.   Breastfeeding burns calories. This can help you lose pregnancy weight faster.   Breastfeeding releases hormones that contract the uterus. This helps the uterus return to its normal size after childbirth.   Mothers who breastfeed have a decreased risk of ovarian and breast cancers.  There are many people who can help you learn to breastfeed. A lactation consultant is a healthcare provider specially trained to help breastfeeding moms. A lactation consultant will visit you after delivery and is available after your baby is born - if you'd like to meet with lactation prior to delivery, let your doctor know!

## 2024-06-20 NOTE — PROGRESS NOTES
"Ambrose De La Rosa is a 39 year old  female who presents to clinic for a follow up OB visit. She is currently 22w2d with an estimated date of delivery of Oct 22, 2024 via LMP c/w 18 wk US. She denies headache, chest pain, shortness of breath, abdominal pain/contractions, vaginal bleeding, or abnormal discharge. She continues to feel baby move.     New concerns today: none    OB History    Para Term  AB Living   4 3 3 0 0 3   SAB IAB Ectopic Multiple Live Births   0 0 0 0 3      # Outcome Date GA Lbr Minesh/2nd Weight Sex Type Anes PTL Lv   4 Current            3 Term 19 37w6d 01:21 / 00:09 3.09 kg (6 lb 13 oz) F Vag-Spont None N SADAF      Name: ISA DE LA ROSA-PA      Apgar1: 9  Apgar5: 9   2 Term 10/02/17 40w3d 03:40 / 00:05 2.77 kg (6 lb 1.7 oz) F Vag-Spont None N SADAF      Complications: Fetal Intolerance      Name: ISA DE LA ROSA-PA      Apgar1: 9  Apgar5: 10   1 Term 16 39w5d 42:50 / 00:39 3.033 kg (6 lb 11 oz) F Vag-Spont Local, Nitrous N SADAF      Name: Noemi      Apgar1: 8  Apgar5: 9       Physical exam:  BP 91/51   Pulse 81   Temp 97.8  F (36.6  C) (Oral)   Resp 16   Ht 1.429 m (4' 8.25\")   Wt 59 kg (130 lb)   LMP 2024   SpO2 100%   BMI 28.89 kg/m      General: alert, female in no acute distress  Abdomen: gravid uterus appropriate for gestation age at 22 cm above pubic symphysis, non-tender, FHTs 150  Extremities: no edema    Prenatal labs  Blood type: B+  Hgb: 11.4  Pap: deferred postpartum  Hep B, HIV, syphilis, gonorrhea, chlamydia, HIV negative  Rubella immune  UA/Ucx + GBS  NIPS nml, surprise gender  Fetal survey: EFW 48 percentile, placenta posterior/fundal, normal amniotic fluid    Assessment/Plan:  1. Encounter for supervision of other normal pregnancy in second trimester  Initial OB labs and fetal survey ultrasound reviewed  Discussed when to call/come in.   Discussed expectations for gestational diabetes screen to be completed at next visit.    2. Multigravida of " advanced maternal age in second trimester  Plan for growth ultrasound at 32 weeks, along with weekly  testing beginning at 36 weeks, deliver by 40 weeks.    3. GBS bacteriuria  <10k GBS bacteriuria on initial OB labs.  Remains asymptomatic.  Repeat today and treat if necessary.  Plan for antibiotics during labor.  - UA Macroscopic with reflex to Microscopic and Culture - Lab Collect; Future      Follow up in 4 weeks for routine prenatal visit.     Jennifer Marie DO

## 2024-06-26 ENCOUNTER — TELEPHONE (OUTPATIENT)
Dept: FAMILY MEDICINE | Facility: CLINIC | Age: 39
End: 2024-06-26
Payer: COMMERCIAL

## 2024-06-26 ENCOUNTER — APPOINTMENT (OUTPATIENT)
Dept: INTERPRETER SERVICES | Facility: CLINIC | Age: 39
End: 2024-06-26
Payer: COMMERCIAL

## 2024-06-26 NOTE — TELEPHONE ENCOUNTER
Attempted to call patient with Hmong  on the line, left message for return call to clinic. Please relay results and recommendations below when she returns call. Thanks!    Sandra Vines RN    ----- Message from Jennifer Marie sent at 6/24/2024 10:14 AM CDT -----  Team - please call patient with results.  Hmong speaking.    Urine showed some contamination but no active infection.  Please increase hydration.  Will plan to repeat a urine sample at 28 weeks gestation.    Jennifer Marie, DO

## 2024-07-02 NOTE — TELEPHONE ENCOUNTER
Left message to call back for: Results  Information to relay to patient: LMTCB with , please see message below.

## 2024-07-18 ENCOUNTER — PRENATAL OFFICE VISIT (OUTPATIENT)
Dept: FAMILY MEDICINE | Facility: CLINIC | Age: 39
End: 2024-07-18
Payer: COMMERCIAL

## 2024-07-18 VITALS
OXYGEN SATURATION: 100 % | BODY MASS INDEX: 29.74 KG/M2 | HEIGHT: 56 IN | DIASTOLIC BLOOD PRESSURE: 61 MMHG | WEIGHT: 132.2 LBS | HEART RATE: 76 BPM | RESPIRATION RATE: 14 BRPM | SYSTOLIC BLOOD PRESSURE: 98 MMHG

## 2024-07-18 DIAGNOSIS — O09.522 MULTIGRAVIDA OF ADVANCED MATERNAL AGE IN SECOND TRIMESTER: ICD-10-CM

## 2024-07-18 DIAGNOSIS — Z34.82 ENCOUNTER FOR SUPERVISION OF OTHER NORMAL PREGNANCY IN SECOND TRIMESTER: Primary | ICD-10-CM

## 2024-07-18 DIAGNOSIS — R82.71 GBS BACTERIURIA: ICD-10-CM

## 2024-07-18 DIAGNOSIS — R73.09 ELEVATED GLUCOSE TOLERANCE TEST: ICD-10-CM

## 2024-07-18 LAB
ERYTHROCYTE [DISTWIDTH] IN BLOOD BY AUTOMATED COUNT: 12.4 % (ref 10–15)
GLUCOSE 1H P 50 G GLC PO SERPL-MCNC: 141 MG/DL (ref 70–129)
HCT VFR BLD AUTO: 36.2 % (ref 35–47)
HGB BLD-MCNC: 12.1 G/DL (ref 11.7–15.7)
MCH RBC QN AUTO: 32 PG (ref 26.5–33)
MCHC RBC AUTO-ENTMCNC: 33.4 G/DL (ref 31.5–36.5)
MCV RBC AUTO: 96 FL (ref 78–100)
PLATELET # BLD AUTO: 173 10E3/UL (ref 150–450)
RBC # BLD AUTO: 3.78 10E6/UL (ref 3.8–5.2)
T PALLIDUM AB SER QL: NONREACTIVE
WBC # BLD AUTO: 7.7 10E3/UL (ref 4–11)

## 2024-07-18 PROCEDURE — 99207 PR COMPLICATED OB VISIT: CPT | Performed by: FAMILY MEDICINE

## 2024-07-18 PROCEDURE — 82950 GLUCOSE TEST: CPT | Performed by: FAMILY MEDICINE

## 2024-07-18 PROCEDURE — 86780 TREPONEMA PALLIDUM: CPT | Performed by: FAMILY MEDICINE

## 2024-07-18 PROCEDURE — 36415 COLL VENOUS BLD VENIPUNCTURE: CPT | Performed by: FAMILY MEDICINE

## 2024-07-18 PROCEDURE — 85027 COMPLETE CBC AUTOMATED: CPT | Performed by: FAMILY MEDICINE

## 2024-07-18 NOTE — PATIENT INSTRUCTIONS
[facility] Information  If you have any further concerns or wish to schedule another appointment, please call our office at [phone].  Call your doctor if you experience any bleeding or abdominal cramping early in pregnancy.     For uncomplicated pregnancies, you will be seeing your doctor once a month until you are 28 weeks, then you will see your doctor twice a month. You will begin weekly visits at 36 weeks until you deliver.     If you have a medical emergency, please call [phone].    [facility] Labor and Delivery: [phone]  [facility] Labor and Delivery: [phone]    When to call or come in to the hospital  If you notice a decrease in your baby's movement.   If your begin to experience contractions that are 5 minutes or less apart and lasting for over 45 seconds, or if contractions are becoming more painful.  If you have any bleeding or leakage of fluids.   If you have a headache not resolved with tylenol, right upper abdominal pain, or sudden onset of swelling.  You know your body best. Never hesitate to call or go to the hospital if something doesn't feel right!  Blood Glucose Screening During Pregnancy   Gestational diabetes is diabetes that only pregnant women get. Changes in your body during pregnancy can cause high blood sugar (glucose). This can cause problems for you and your baby. It is a serious condition, but it can be controlled.  What to Know If You Test Positive   Gestational diabetes is treatable. The best way to control gestational diabetes is to find out you have it as early as possible and start treatment quickly.   Gestational diabetes can cause problems for the mother during pregnancy. It can also cause problems with the baby during pregnancy, delivery, and after. Treatment greatly lowers the chance for problems.   The changes in your body that cause gestational diabetes normally occur only when you are pregnant. After the baby is born, your body goes back to normal and the  condition goes away. You may be more likely to have type 2 diabetes later, though. So talk to your doctor about ways to help prevent type 2 diabetes.  Treating Gestational Diabetes   You ll need to check your blood sugar regularly. You can do this at home by pricking your finger and checking a drop of blood on a glucose monitor. Your health care provider will show you how and when to check your blood sugar and discuss your target blood sugar level.   To manage your blood sugar, you will be given a special plan. It will likely involve planning your meals and getting regular exercise. Some women need to take a hormone called insulin, or an oral hypoglycemic medication to help control their blood sugar.    Making Plans for Feeding Your Baby  By this point, you probably have read a lot about feeding your baby. Breastfeed or formula? Each mother s decision is her own and [facility] respects you and your choices. We ve gathered information on both breastfeeding and formula feeding to help with your decision. Talking with your physician or nurse-midwife can also help in your decision. However you plan to feed your baby, [facility] encourage rooming in with your baby, skin-to-skin contact and feeding your baby based on his or her cues.    Skin-to-skin contact  Being close to mom helps your baby adjust to life outside of the womb. It helps your baby regulate their body temperature, heart rate, and breathing. Your baby will usually be placed skin-to-skin immediately following birth or as soon as possible, if medical intervention is needed.    Rooming-In  Having your baby stay with you in your room is called  rooming-in.  Keeping your baby in your room helps you to learn how to care for your baby by getting to know your baby s cues, body rhythms and sleep cycle.       Cue-based feeding  Cues (signals) are baby s way of telling you what he or she wants. When you learn your infant s cues, you know how to  care for and feed your baby. Feeding cues are the licking and smacking of lips, bringing their fist to their mouth, and a reflex called  rooting - where baby turns and opens his or her mouth, searching for the breast or bottle. Crying is a late feeding cue. Babies can feed frequently, often at least 8 times in 24 hours.  Breastfeeding facts  Breast milk is the best source of nutrition for your baby and is available at birth. In the first couple of days, your milk volume is already starting to increase, though it may not be noticeable. Breastfeed frequently to increase your milk supply. Within three to five days, you will begin to notice larger milk volumes. An increase in breast size, heaviness and firmness are often described as the milk  coming in.  Frequent breastfeeding can help breasts from getting overly firm and painful. You will know the baby is getting enough milk if your baby is having wet and dirty diapers and gaining weight.     If your goal is to exclusively breastfeed, it is important to not use any formula or artificial nipples (including bottles and pacifiers) while your baby is learning to breastfeed. While it may seem like an  easy  option to give your baby a bottle, formula should only be given if there is a medical reason for your baby to have it.    Positioning and attachment   Get comfortable. Use pillows as needed to support your arms and baby. Hold baby close at the level of your breast, facing you in a tummy to tummy position. Skin to skin helps with this. Position the baby with his or her nose by the nipple. There should be a straight line from baby s ear to shoulder to hips.  Tickle your baby s lips or wait for baby to open mouth wide, bring baby to breast by leading with the chin. Aim the nipple at the roof of baby s mouth. A rapid sucking pattern is followed by longer, drawing pattern with occasional swallows heard. When baby is correctly latched, your nipple and much of the areola are  pulled well into baby s mouth.      Returning to work or school  Focus on a good start to breastfeeding. Many women continue to provide breastmilk for their baby when they return to work or school. Making plans about where to pump and store milk can make the transition go well. Talk with other mothers who have also returned to work or school for tips and support. Your employer s Human Resource department may be a resource as well.      babies can mean fewer  sick  days for you.  A quality breast pump will also save time and add comfort. Check with your insurance prior to giving birth for breast pump coverage. Many insurance companies include a pump within your benefits.  Wait until your baby is at least three weeks old to introduce a bottle for the first time. Have someone besides you give the bottle.  Breastfeed when you are with your baby. Reserve your bottles of breast milk for when you are away.   Your breasts will need to be  emptied  either by your baby or a pump. Plan to pump at least twice in an eight hour day.  If you cannot pump at work, continue breastfeeding at home. Any amount of breast milk is worth giving to your baby.    Formula feeding facts  If you are planning to use formula to feed your baby, you will want to make some preparations ahead of time. Talk to your doctor or nurse-midwife about what type of formula to use. Some are iron-fortified, meaning they have extra iron in them. You will want to purchase formula and bottles before your baby is born to be sure you are ready after you return from the hospital. [facility] do not provide formula samples to take home.    Be sure to follow formula mixing directions closely. Regular milk in the dairy case at the grocery store should not be given to babies under 1 year old. Baby formula is sold in several forms including:  Ready-to-use. This is the most expensive, but no mixing is necessary.  Concentrated liquid. This is less  expensive than ready-to-use and you mix with water.  Powder. This is the least expensive. You mix one level scoop of powdered formula with two ounces of water and stir well.    Most babies need 2.5 ounces of formula per pound of body weight each day. This means an 8-pound baby may drink about 20 ounces of formula a day; however, this is just an estimate. The most important thing is to pay attention to your baby s cues. If your baby is always fussy, needs more iron or has certain food allergies, your physician may suggest you change your baby s formula to a different kind.     How do I warm my baby s bottles?  You may feed your baby a bottle without warming it first. It is OK for the breast milk or formula to be cool or room temperature. If your baby seems to prefer it warmed, you can put the filled bottle in a container of warm water and let it stand for a few minutes. Check the temperature of the liquid on your skin before feeding it to your baby; to be sure it isn t too hot. Do not heat bottles in the microwave. Microwaves heat food and liquids unevenly, and this can cause hot spots that can burn your baby.    How do I clean and sterilize bottles?  Sterilize bottles and nipples before you use them for the first time. You can do this by putting them in boiling water for 5 minutes. After that first time, you can wash them in hot and soapy water. Rinse them carefully to be sure there is no soap left on them. You can also wash them in the .    [facility] [phone] ([phone])

## 2024-07-18 NOTE — PROGRESS NOTES
"Ambrose De La Rosa is a 39 year old  female who presents to clinic for a follow up OB visit. She is currently 26w2d with an estimated date of delivery of Oct 22, 2024 via LMP consistent with 18-week ultrasound. She denies headache, chest pain, shortness of breath, abdominal pain/contractions, vaginal bleeding, or abnormal discharge. She continues to feel baby move.     New concerns today: none    OB History    Para Term  AB Living   4 3 3 0 0 3   SAB IAB Ectopic Multiple Live Births   0 0 0 0 3      # Outcome Date GA Lbr Minesh/2nd Weight Sex Type Anes PTL Lv   4 Current            3 Term 19 37w6d 01:21 / 00:09 3.09 kg (6 lb 13 oz) F Vag-Spont None N SADAF      Name: ISA DE LA ROSA-PA      Apgar1: 9  Apgar5: 9   2 Term 10/02/17 40w3d 03:40 / 00:05 2.77 kg (6 lb 1.7 oz) F Vag-Spont None N SADAF      Complications: Fetal Intolerance      Name: ISA DE LA ROSA-PA      Apgar1: 9  Apgar5: 10   1 Term 16 39w5d 42:50 / 00:39 3.033 kg (6 lb 11 oz) F Vag-Spont Local, Nitrous N SADAF      Name: Noemi      Apgar1: 8  Apgar5: 9       Physical exam:  BP 98/61 (BP Location: Left arm, Patient Position: Sitting, Cuff Size: Adult Regular)   Pulse 76   Resp 14   Ht 1.429 m (4' 8.25\")   Wt 60 kg (132 lb 3.2 oz)   LMP 2024   SpO2 100%   BMI 29.38 kg/m      General: alert, female in no acute distress  Abdomen: gravid uterus appropriate for gestation age at 27 cm above pubic symphysis, non-tender, FHTs 135  Extremities: no edema    Prenatal labs  Blood type: B+  Hgb: 11.4-->  Pap: deferred postpartum  Hep B, HIV, syphilis, gonorrhea, chlamydia, HIV negative  Rubella immune  UA/Ucx + GBS  NIPS nml, surprise gender  Fetal survey: EFW 48 percentile, placenta posterior/fundal, normal amniotic fluid  1 hr GTT pending    Assessment/Plan:  1. Encounter for supervision of other normal pregnancy in second trimester  Information given on gestational diabetes. 1 hour glucose tolerance test today, in addition to CBC and " syphilis.  B+ blood type, no RhoGAM indicated.  Plan to update Tdap at follow-up visit in 4 weeks.  Discussed signs and symptoms of  labor.   Evaluate for decreased fetal movement.     2. Multigravida of advanced maternal age in second trimester  Plan for growth ultrasound at 32 weeks, along with weekly  testing beginning at 36 weeks, deliver by 40 weeks.     3. GBS bacteriuria  <10k GBS bacteriuria on initial OB labs. Plan for antibiotics during labor.       Follow up in 4 weeks for routine prenatal visit.     Jennifer Marie DO

## 2024-07-23 ENCOUNTER — TELEPHONE (OUTPATIENT)
Dept: FAMILY MEDICINE | Facility: CLINIC | Age: 39
End: 2024-07-23
Payer: COMMERCIAL

## 2024-07-23 NOTE — TELEPHONE ENCOUNTER
Called patient's  and discussed results. Assisted with setting up lab appt for 3 hour GTT on Friday this week.      ----- Message from Jennifer Marie sent at 7/23/2024  9:50 AM CDT -----  Team - please call patient with results.  Patient speaks Hmong, her  speaks English.  1 hour glucose tolerance test was elevated, please help patient schedule III hour glucose tolerance test via a lab only visit this week.  She should come fasting.  If 2 or more of these levels are elevated, she may meet criteria for gestational diabetes and we can discuss next steps.    Jennifer Marie, DO

## 2024-08-01 ENCOUNTER — LAB (OUTPATIENT)
Dept: LAB | Facility: CLINIC | Age: 39
End: 2024-08-01
Payer: COMMERCIAL

## 2024-08-01 DIAGNOSIS — R73.09 ELEVATED GLUCOSE TOLERANCE TEST: ICD-10-CM

## 2024-08-01 LAB
GESTATIONAL GTT 1 HR POST DOSE: 138 MG/DL (ref 60–179)
GESTATIONAL GTT 2 HR POST DOSE: 144 MG/DL (ref 60–154)
GLUCOSE P FAST SERPL-MCNC: 75 MG/DL (ref 60–94)

## 2024-08-01 PROCEDURE — 82952 GTT-ADDED SAMPLES: CPT

## 2024-08-01 PROCEDURE — 82951 GLUCOSE TOLERANCE TEST (GTT): CPT

## 2024-08-01 PROCEDURE — 36415 COLL VENOUS BLD VENIPUNCTURE: CPT

## 2024-08-02 LAB — GESTATIONAL GTT 3 HR POST DOSE: 134 MG/DL (ref 60–139)

## 2024-08-07 ENCOUNTER — TELEPHONE (OUTPATIENT)
Dept: FAMILY MEDICINE | Facility: CLINIC | Age: 39
End: 2024-08-07
Payer: COMMERCIAL

## 2024-08-07 NOTE — TELEPHONE ENCOUNTER
----- Message from Jennifer Marie sent at 8/2/2024  3:07 PM CDT -----  Team - please call patient with results.    Normal 3 hour glucose test, no further glucose tests needed. Routine OB follow up.    Jennifer Marie,

## 2024-08-15 ENCOUNTER — PRENATAL OFFICE VISIT (OUTPATIENT)
Dept: FAMILY MEDICINE | Facility: CLINIC | Age: 39
End: 2024-08-15
Payer: COMMERCIAL

## 2024-08-15 VITALS
HEART RATE: 89 BPM | HEIGHT: 57 IN | TEMPERATURE: 98.2 F | DIASTOLIC BLOOD PRESSURE: 61 MMHG | WEIGHT: 138.6 LBS | RESPIRATION RATE: 14 BRPM | SYSTOLIC BLOOD PRESSURE: 106 MMHG | OXYGEN SATURATION: 99 % | BODY MASS INDEX: 29.9 KG/M2

## 2024-08-15 DIAGNOSIS — Z34.83 ENCOUNTER FOR SUPERVISION OF OTHER NORMAL PREGNANCY IN THIRD TRIMESTER: Primary | ICD-10-CM

## 2024-08-15 DIAGNOSIS — R82.71 GBS BACTERIURIA: ICD-10-CM

## 2024-08-15 DIAGNOSIS — O09.523 MULTIGRAVIDA OF ADVANCED MATERNAL AGE IN THIRD TRIMESTER: ICD-10-CM

## 2024-08-15 PROCEDURE — 99207 PR PRENATAL VISIT: CPT | Performed by: FAMILY MEDICINE

## 2024-08-15 PROCEDURE — 90471 IMMUNIZATION ADMIN: CPT | Performed by: FAMILY MEDICINE

## 2024-08-15 PROCEDURE — 90715 TDAP VACCINE 7 YRS/> IM: CPT | Performed by: FAMILY MEDICINE

## 2024-08-15 NOTE — PATIENT INSTRUCTIONS
[facility] Information  If you have any further concerns or wish to schedule another appointment, please call our office at [phone].  Call your doctor if you experience any bleeding or abdominal cramping early in pregnancy.     For uncomplicated pregnancies, you will be seeing your doctor once a month until you are 28 weeks, then you will see your doctor twice a month. You will begin weekly visits at 36 weeks until you deliver.     If you have a medical emergency, please call [phone].    [facility] Labor and Delivery: [phone]  [facility] Labor and Delivery: [phone]    When to call or come in to the hospital  If you notice a decrease in your baby's movement.   If your begin to experience contractions that are 5 minutes or less apart and lasting for over 45 seconds, or if contractions are becoming more painful.  If you have any bleeding or leakage of fluids.   If you have a headache not resolved with tylenol, right upper abdominal pain, or sudden onset of swelling.  You know your body best. Never hesitate to call or go to the hospital if something doesn't feel right!  After-baby Birth Control Methods   It is important to consider contraception after your baby is born if you would like to prevent a pregnancy in the near future. If you are breast feeding, talk with your doctor to determine the best method for you. It is recommended that you wait 12 months after the birth of your baby to get pregnant again.   Condoms   Latex condoms can prevent pregnancy and STDs. Condoms work best when used with spermicide that is placed inside the vagina as well as inside the condom. Use only water-based lubricants. Petroleum based products (such as Vaseline and many massage oils) can weaken the latex and cause it to break.   Iud   IUD's are made of flexible plastic and must be inserted into your uterus by a doctor. The IUD works by stopping the fertilized egg from attaching itself to the uterus. IUDs may increase  the risk of getting a pelvic infection (PID), which can lead to infertility if not diagnosed and treated early. This is a great option after delivery of your baby! These last for 3, 5, or 10 years depending up on which type you choose, but can be removed earlier if you decide you would like to get pregnant sooner.  Tubal Ligation & Vasectomy   These are good choices for women and men who know that they do not want to have any more children.     HORMONES   Birth control pills, hormone implants and shots work by stopping ovulation (release of the egg from the ovary). Implants are placed in the upper arm by a minor surgical incision. They last for five years, but can be removed by your doctor if you decide to get pregnant. Hormone injections must be repeated every three months. The Pill must be taken every day.   All hormones can have side effects and create certain health risks. They are very effective in preventing pregnancy but they do not prevent STDs. You can talk more about the risks and benefits with your doctor.

## 2024-08-15 NOTE — PROGRESS NOTES
Ambrose De La Rosa is a 39 year old  female who presents to clinic for a follow up OB visit. She is currently 30w2d with an estimated date of delivery of Oct 22, 2024 via LMP consistent with 18-week ultrasound. She denies headache, chest pain, shortness of breath, abdominal pain/contractions, vaginal bleeding, or abnormal discharge. She continues to feel baby move.     New concerns today: none    OB History    Para Term  AB Living   4 3 3 0 0 3   SAB IAB Ectopic Multiple Live Births   0 0 0 0 3      # Outcome Date GA Lbr Minesh/2nd Weight Sex Type Anes PTL Lv   4 Current            3 Term 19 37w6d 01:21 / 00:09 3.09 kg (6 lb 13 oz) F Vag-Spont None N SADAF      Name: ISA DE LA ROSA-PA      Apgar1: 9  Apgar5: 9   2 Term 10/02/17 40w3d 03:40 / 00:05 2.77 kg (6 lb 1.7 oz) F Vag-Spont None N SADAF      Complications: Fetal Intolerance      Name: ISA DE LA ROSA-PA      Apgar1: 9  Apgar5: 10   1 Term 16 39w5d 42:50 / 00:39 3.033 kg (6 lb 11 oz) F Vag-Spont Local, Nitrous N SADAF      Name: Noemi      Apgar1: 8  Apgar5: 9       Physical exam:  LMP 2024     General: alert, female in no acute distress  Abdomen: gravid uterus appropriate for gestation age at 30 cm above pubic symphysis, non-tender, FHTs 145  Extremities: no edema    Prenatal labs  Blood type: B+  Hgb: 11.4-->12.1  Pap: deferred postpartum  Hep B, HIV, syphilisx2, gonorrhea, chlamydia, HIV negative  Rubella immune  UA/Ucx + GBS  NIPS nml, surprise gender  Fetal survey: EFW 48 percentile, placenta posterior/fundal, normal amniotic fluid  Evaded 1 hour GTT, passed 3-hour GTT    Assessment/Plan:  1. Encounter for supervision of other normal pregnancy in second trimester  Elevated 1 hour GTT, passed 3-hour GTT.  Update Tdap today.  Discussed signs and symptoms of  labor.   Evaluate for decreased fetal movement.     2. Multigravida of advanced maternal age in second trimester  Plan for growth ultrasound at 32 weeks-ordered. Weekly   testing beginning at 36 weeks, deliver by 40 weeks.     3. GBS bacteriuria  <10k GBS bacteriuria on initial OB labs. Plan for antibiotics during labor.       Follow up in 2 weeks for routine prenatal visit.     Jennifer Marie DO

## 2024-08-29 ENCOUNTER — PRENATAL OFFICE VISIT (OUTPATIENT)
Dept: FAMILY MEDICINE | Facility: CLINIC | Age: 39
End: 2024-08-29
Payer: COMMERCIAL

## 2024-08-29 VITALS
HEART RATE: 87 BPM | SYSTOLIC BLOOD PRESSURE: 108 MMHG | WEIGHT: 142.5 LBS | BODY MASS INDEX: 30.84 KG/M2 | DIASTOLIC BLOOD PRESSURE: 54 MMHG

## 2024-08-29 DIAGNOSIS — O09.523 MULTIGRAVIDA OF ADVANCED MATERNAL AGE IN THIRD TRIMESTER: ICD-10-CM

## 2024-08-29 DIAGNOSIS — R82.71 GBS BACTERIURIA: ICD-10-CM

## 2024-08-29 DIAGNOSIS — Z34.83 ENCOUNTER FOR SUPERVISION OF OTHER NORMAL PREGNANCY IN THIRD TRIMESTER: Primary | ICD-10-CM

## 2024-08-29 LAB
ALBUMIN UR-MCNC: NEGATIVE MG/DL
APPEARANCE UR: CLEAR
BACTERIA #/AREA URNS HPF: ABNORMAL /HPF
BILIRUB UR QL STRIP: NEGATIVE
CA CARBONATE CRY #/AREA URNS HPF: ABNORMAL /HPF
COLOR UR AUTO: YELLOW
GLUCOSE UR STRIP-MCNC: NEGATIVE MG/DL
HGB UR QL STRIP: NEGATIVE
KETONES UR STRIP-MCNC: NEGATIVE MG/DL
LEUKOCYTE ESTERASE UR QL STRIP: ABNORMAL
MUCOUS THREADS #/AREA URNS LPF: PRESENT /LPF
NITRATE UR QL: NEGATIVE
PH UR STRIP: 7 [PH] (ref 5–8)
RBC #/AREA URNS AUTO: ABNORMAL /HPF
RENAL EPI CELLS #/AREA URNS HPF: ABNORMAL /HPF
SP GR UR STRIP: 1.02 (ref 1–1.03)
SQUAMOUS #/AREA URNS AUTO: ABNORMAL /LPF
UROBILINOGEN UR STRIP-ACNC: 0.2 E.U./DL
WBC #/AREA URNS AUTO: ABNORMAL /HPF

## 2024-08-29 PROCEDURE — 99207 PR PRENATAL VISIT: CPT | Performed by: FAMILY MEDICINE

## 2024-08-29 PROCEDURE — T1013 SIGN LANG/ORAL INTERPRETER: HCPCS | Mod: U4 | Performed by: FAMILY MEDICINE

## 2024-08-29 PROCEDURE — 81001 URINALYSIS AUTO W/SCOPE: CPT | Performed by: FAMILY MEDICINE

## 2024-08-29 NOTE — PROGRESS NOTES
Ambrose De La Rosa is a 39 year old  female who presents to clinic for a follow up OB visit. She is currently 32w2d with an estimated date of delivery of Oct 22, 2024 via LMP consistent with 18-week ultrasound. She denies headache, chest pain, shortness of breath, abdominal pain/contractions, vaginal bleeding, or abnormal discharge. She continues to feel baby move.     New concerns today: none.     OB History    Para Term  AB Living   4 3 3 0 0 3   SAB IAB Ectopic Multiple Live Births   0 0 0 0 3      # Outcome Date GA Lbr Minesh/2nd Weight Sex Type Anes PTL Lv   4 Current            3 Term 19 37w6d 01:21 / 00:09 3.09 kg (6 lb 13 oz) F Vag-Spont None N SADAF      Name: ISA DE LA ROSA-PA      Apgar1: 9  Apgar5: 9   2 Term 10/02/17 40w3d 03:40 / 00:05 2.77 kg (6 lb 1.7 oz) F Vag-Spont None N SADAF      Complications: Fetal Intolerance      Name: ISA DE LA ROSA-PA      Apgar1: 9  Apgar5: 10   1 Term 16 39w5d 42:50 / 00:39 3.033 kg (6 lb 11 oz) F Vag-Spont Local, Nitrous N SADAF      Name: Noemi      Apgar1: 8  Apgar5: 9       Physical exam:  /54 (BP Location: Left arm, Patient Position: Sitting, Cuff Size: Adult Regular)   Pulse 87   Wt 64.6 kg (142 lb 8 oz)   LMP 2024   BMI 30.84 kg/m      General: alert, female in no acute distress  Abdomen: gravid uterus appropriate for gestation age at 32 cm above pubic symphysis, non-tender, FHTs 135  Extremities: no edema    Prenatal labs  Blood type: B+  Hgb: 11.4-->12.1  Pap: deferred postpartum  Hep B, HIV, syphilisx2, gonorrhea, chlamydia, HIV negative  Rubella immune  UA/Ucx + GBS  NIPS nml, surprise gender  Fetal survey: EFW 48 percentile, placenta posterior/fundal, normal amniotic fluid  Evaded 1 hour GTT, passed 3-hour GTT  TDAP 8/15/24    Assessment/Plan:  1. Encounter for supervision of other normal pregnancy in third trimester  Previously updated Tdap.  Offer RSV vaccine at follow-up visit.  Discussed pre-term labor signs and symptoms and  when to call/come in.     2. Multigravida of advanced maternal age in third trimester  Plan for growth ultrasound at 32 weeks-scheduled 9/3. Weekly  testing beginning at 36 weeks, deliver by 40 weeks.     3. GBS bacteriuria  <10k GBS bacteriuria on initial OB labs.  Reassess UA today and treat if necessary, though patient remains asymptomatic.  Plan for antibiotics during labor.       Follow up in 2 weeks for routine prenatal visit.     Jennifer Marie DO

## 2024-08-29 NOTE — PATIENT INSTRUCTIONS
[facility] Information  If you have any further concerns or wish to schedule another appointment, please call our office at [phone].  Call your doctor if you experience any bleeding or abdominal cramping early in pregnancy.     For uncomplicated pregnancies, you will be seeing your doctor once a month until you are 28 weeks, then you will see your doctor twice a month. You will begin weekly visits at 36 weeks until you deliver.     If you have a medical emergency, please call 911.    [facility] Labor and Delivery: [phone]  [facility] Labor and Delivery: [phone]    When to call or come in to the hospital  If you notice a decrease in your baby's movement.   If your begin to experience contractions that are 5 minutes or less apart and lasting for over 45 seconds, or if contractions are becoming more painful.  If you have any bleeding or leakage of fluids.   If you have a headache not resolved with tylenol, right upper abdominal pain, or sudden onset of swelling.  You know your body best. Never hesitate to call or go to the hospital if something doesn't feel right!    Preparing for the hospital  You re likely feeling anxious as your child s birth approaches. This is normal. To give yourself some peace of mind, pack a bag 3-4 weeks before your due date. Here is a list of things to remember:  Personal care items like toothbrush, hair brush, lip balm, lotion, shampoo, glasses, contacts  Nightgown, bathrobe, slippers  Several pairs of underwear  Comfortable clothes for you to wear home  Camera with new batteries  Cell phone and   Insurance information and any other paperwork needed for your hospital stay  Clothes for baby to wear home  An infant, rear-facing car seat for bringing home your baby (this is required by law)    Controlling Back Pain  As your body changes during pregnancy, your back must work in new ways. Back pain is due to many causes. Physical changes in your body can strain  your back and its supporting muscles. Also, hormones (chemicals that carry messages throughout the body) increase during pregnancy. This can affect how the muscles and joints work together. All of these changes can lead to pain in the upper or lower back or pelvis. Some pregnant women have sciatica, pain caused by pressure on the sciatic nerve running down the back of the leg. Ask your healthcare provider for specific tips and exercises to help control your back pain.    Tips to Help You Rest  Good rest and sleep will help you feel better. Here are some ideas:  Ask your partner to massage your shoulders, neck, or back.  Limit the errands you do each day.  Lie down in the afternoon or after work for a few minutes.  Take a warm bath before you go to sleep.  Drink warm milk or teas without caffeine.  Avoid coffee, black tea, and cola.    Preventing Heartburn  Avoid spicy or acidic foods.   Eat small amounts more often.  Wait 2 hours after eating before lying down   Sleep with your upper body raised 6 inches.  May use Tums as needed. Talk to your doctor about other medications to try.

## 2024-09-03 ENCOUNTER — HOSPITAL ENCOUNTER (OUTPATIENT)
Dept: ULTRASOUND IMAGING | Facility: HOSPITAL | Age: 39
Discharge: HOME | End: 2024-09-03
Attending: FAMILY MEDICINE | Admitting: FAMILY MEDICINE
Payer: COMMERCIAL

## 2024-09-03 DIAGNOSIS — O09.523 MULTIGRAVIDA OF ADVANCED MATERNAL AGE IN THIRD TRIMESTER: ICD-10-CM

## 2024-09-03 PROCEDURE — 76816 OB US FOLLOW-UP PER FETUS: CPT

## 2024-09-05 ENCOUNTER — TELEPHONE (OUTPATIENT)
Dept: FAMILY MEDICINE | Facility: CLINIC | Age: 39
End: 2024-09-05
Payer: COMMERCIAL

## 2024-09-12 ENCOUNTER — PRENATAL OFFICE VISIT (OUTPATIENT)
Dept: FAMILY MEDICINE | Facility: CLINIC | Age: 39
End: 2024-09-12
Payer: COMMERCIAL

## 2024-09-12 VITALS
BODY MASS INDEX: 31.24 KG/M2 | HEIGHT: 57 IN | OXYGEN SATURATION: 97 % | DIASTOLIC BLOOD PRESSURE: 63 MMHG | WEIGHT: 144.8 LBS | RESPIRATION RATE: 16 BRPM | SYSTOLIC BLOOD PRESSURE: 107 MMHG | HEART RATE: 87 BPM | TEMPERATURE: 98.5 F

## 2024-09-12 DIAGNOSIS — O09.523 MULTIGRAVIDA OF ADVANCED MATERNAL AGE IN THIRD TRIMESTER: ICD-10-CM

## 2024-09-12 DIAGNOSIS — Z34.83 ENCOUNTER FOR SUPERVISION OF OTHER NORMAL PREGNANCY IN THIRD TRIMESTER: Primary | ICD-10-CM

## 2024-09-12 DIAGNOSIS — R82.71 GBS BACTERIURIA: ICD-10-CM

## 2024-09-12 PROCEDURE — 99207 PR COMPLICATED OB VISIT: CPT | Performed by: FAMILY MEDICINE

## 2024-09-12 PROCEDURE — 90471 IMMUNIZATION ADMIN: CPT | Performed by: FAMILY MEDICINE

## 2024-09-12 PROCEDURE — 90678 RSV VACC PREF BIVALENT IM: CPT | Performed by: FAMILY MEDICINE

## 2024-09-12 NOTE — PROGRESS NOTES
"Ambrose De La Rosa is a 39 year old  female who presents to clinic for a follow up OB visit. She is currently 34w2d with an estimated date of delivery of Oct 22, 2024 via LMP consistent with 18-week ultrasound. She denies headache, chest pain, shortness of breath, abdominal pain/contractions, vaginal bleeding, or abnormal discharge. She continues to feel baby move.     New concerns today: Growth ultrasound 9/3/2024 EFW 66%. No concerns.     OB History    Para Term  AB Living   4 3 3 0 0 3   SAB IAB Ectopic Multiple Live Births   0 0 0 0 3      # Outcome Date GA Lbr Minesh/2nd Weight Sex Type Anes PTL Lv   4 Current            3 Term 19 37w6d 01:21 / 00:09 3.09 kg (6 lb 13 oz) F Vag-Spont None N SADAF      Name: ISA DE LA ROSA-PA      Apgar1: 9  Apgar5: 9   2 Term 10/02/17 40w3d 03:40 / 00:05 2.77 kg (6 lb 1.7 oz) F Vag-Spont None N SADAF      Complications: Fetal Intolerance      Name: ISA DE LA ROSA-PA      Apgar1: 9  Apgar5: 10   1 Term 16 39w5d 42:50 / 00:39 3.033 kg (6 lb 11 oz) F Vag-Spont Local, Nitrous N SADAF      Name: Noemi      Apgar1: 8  Apgar5: 9       Physical exam:  /63   Pulse 87   Temp 98.5  F (36.9  C) (Oral)   Resp 16   Ht 1.448 m (4' 9\")   Wt 65.7 kg (144 lb 12.8 oz)   LMP 2024   SpO2 97%   BMI 31.33 kg/m      General: alert, female in no acute distress  Abdomen: gravid uterus appropriate for gestation age at 34 cm above pubic symphysis, non-tender, FHTs 135  Extremities: no edema    Prenatal labs  Blood type: B+  Hgb: 11.4-->12.1  Pap: deferred postpartum  Hep B, HIV, syphilisx2, gonorrhea, chlamydia, HIV negative  Rubella immune  UA/Ucx + GBS  NIPS nml, surprise gender  Fetal survey: EFW 48 percentile, placenta posterior/fundal, normal amniotic fluid  Elevated 1 hour GTT, passed 3-hour GTT  TDAP 8/15/24, RSV 24  Breast and formula  Nonpharm pain methods    Assessment/Plan:  1. Encounter for supervision of other normal pregnancy in third trimester  RSV " vaccine today, influenza vaccine next visit  Hemoglobin only next visit, GBS prophylaxis discussed below  Discussed pre-term labor signs and symptoms and when to call/come in.     2. Multigravida of advanced maternal age in third trimester  Normal 32-week growth ultrasound EFW 66%.  Weekly  testing beginning at 36 weeks, deliver by 40 weeks.     3. GBS bacteriuria  <10k GBS bacteriuria on initial OB labs.  Plan for antibiotics during labor.       Follow up in 2 weeks for routine prenatal visit.     Jennifer Marie DO

## 2024-09-12 NOTE — PATIENT INSTRUCTIONS
[facility] Information  If you have any further concerns or wish to schedule another appointment, please call our office at [phone].  Call your doctor if you experience any bleeding or abdominal cramping early in pregnancy.     For uncomplicated pregnancies, you will be seeing your doctor once a month until you are 28 weeks, then you will see your doctor twice a month. You will begin weekly visits at 36 weeks until you deliver.     If you have a medical emergency, please call 911.    [facility] Labor and Delivery: [phone]  [facility] [place] Labor and Delivery: [phone]    When to call or come in to the hospital  If you notice a decrease in your baby's movement.   If your begin to experience contractions that are 5 minutes or less apart and lasting for over 45 seconds, or if contractions are becoming more painful.  If you have any bleeding or leakage of fluids.   If you have a headache not resolved with tylenol, right upper abdominal pain, or sudden onset of swelling.  You know your body best. Never hesitate to call or go to the hospital if something doesn't feel right!    Preparing for the hospital  You re likely feeling anxious as your child s birth approaches. This is normal. To give yourself some peace of mind, pack a bag 3-4 weeks before your due date. Here is a list of things to remember:  Personal care items like toothbrush, hair brush, lip balm, lotion, shampoo, glasses, contacts  Nightgown, bathrobe, slippers  Several pairs of underwear  Comfortable clothes for you to wear home  Camera with new batteries  Cell phone and   Insurance information and any other paperwork needed for your hospital stay  Clothes for baby to wear home  An infant, rear-facing car seat for bringing home your baby (this is required by law)  Managing Labor Pain   There are many ways to manage pain during labor. It can often be done with no anesthesia or strong pain medications. Talk to your health care provider  "about any options you would like to explore.   Help from Relaxation  Some of these are learned in special classes. Your health care provider can help you find classes. The hospital has a tub you can use during early labor. These methods may be of help to you:   Breathing techniques   A warm tub between contractions   Massage and therapeutic touch by your support person or labor    Reading materials that are comforting or inspiring   Music that is soothing   Hypnosis   Acupuncture and acupressure   Heat and cold applicatio  Help From Analgesics   Analgesics are mild medications that reduce pain. They can be used along with some relaxation methods. They can give you pain relief without total loss of feeling. They may lessen the pain of strong contractions. You may feel well enough to nap between contractions. They have little effect on your baby if given early in labor. This may be done by injection or by IV.   Help from Inhaled Medications  Nitrous oxide (\"laughing gas\") mixed with oxygen is another option for anesthesia. This is administered by breathing through a mask that you can take off or put on when you would like. It is safe for you and your baby during labor. Women have various responses to pain relief from this method, typically you will feel less bothered by the pain.   Help From Anesthetics   Anesthesia involves blockage of all feeling including pain. It can be given in the form of local anesthesia or general anesthesia.  Anesthesia is a type of medication to prevent pain. It is often used in labor. It may numb only one region of your body. This is called regional anesthesia. Or it may let you sleep during surgery. This is called general anesthesia. This type of medication is given by a trained specialist. When possible, regional anesthesia will be used. This is so you can be awake during your baby s birth.   Regional Anesthesia   Regional anesthesia may be used to numb your lower body for a vaginal " or  birth. It does not go into your bloodstream. This means that little or none of it will reach your baby. There are two kinds:   Epidural. This is most often given while you sit up or lie on your side. A needle with a flexible tube (catheter) is put in your lower back. The needle is then removed. The anesthetic is sent through the catheter. A pump may be attached. This gives you a constant level of anesthetic. An epidural often only partly affects muscle control. This means you should still be able to push for a vaginal birth.   Spinal. This is most often given in one dose right before delivery. It acts fast. You may sit up or lie down when it is injected. It may affect muscle control in your lower body. This includes the ability to push.  General Anesthesia   General anesthesia lets you sleep and keeps you free of pain during surgery. It may be used for a . It may be given as an injection. It may be given as an inhaled gas. Or it may be given as both. Delivery often occurs before the medication has reached the baby.

## 2024-09-12 NOTE — TELEPHONE ENCOUNTER
----- Message from Jennifer Marie sent at 9/4/2024 10:23 AM CDT -----  Team - please call patient with results.    Baby is growing appropriately, estimated fetal weight in the 66 percentile.    Jennifer Marie, DO        
LMTCB - please relay message from provider upon return call. Thanks!  
LMTCB, Please see message below      
Patient has not returned our call, but she was seen today.  Would you want us to call her still?  
FAMILY HISTORY:  No pertinent family history in first degree relatives

## 2024-09-26 ENCOUNTER — PRENATAL OFFICE VISIT (OUTPATIENT)
Dept: FAMILY MEDICINE | Facility: CLINIC | Age: 39
End: 2024-09-26
Payer: COMMERCIAL

## 2024-09-26 VITALS
WEIGHT: 147.2 LBS | TEMPERATURE: 98.3 F | DIASTOLIC BLOOD PRESSURE: 54 MMHG | OXYGEN SATURATION: 98 % | RESPIRATION RATE: 20 BRPM | SYSTOLIC BLOOD PRESSURE: 97 MMHG | HEART RATE: 89 BPM | BODY MASS INDEX: 31.85 KG/M2

## 2024-09-26 DIAGNOSIS — Z34.83 ENCOUNTER FOR SUPERVISION OF OTHER NORMAL PREGNANCY IN THIRD TRIMESTER: Primary | ICD-10-CM

## 2024-09-26 DIAGNOSIS — O09.523 MULTIGRAVIDA OF ADVANCED MATERNAL AGE IN THIRD TRIMESTER: ICD-10-CM

## 2024-09-26 DIAGNOSIS — R82.71 GBS BACTERIURIA: ICD-10-CM

## 2024-09-26 PROCEDURE — 90656 IIV3 VACC NO PRSV 0.5 ML IM: CPT | Performed by: FAMILY MEDICINE

## 2024-09-26 PROCEDURE — 59025 FETAL NON-STRESS TEST: CPT | Performed by: FAMILY MEDICINE

## 2024-09-26 PROCEDURE — 90471 IMMUNIZATION ADMIN: CPT | Performed by: FAMILY MEDICINE

## 2024-09-26 PROCEDURE — 99207 PR PRENATAL VISIT: CPT | Performed by: FAMILY MEDICINE

## 2024-09-26 NOTE — PATIENT INSTRUCTIONS
[facility] Information  If you have any further concerns or wish to schedule another appointment, please call our office at [phone].  Call your doctor if you experience any bleeding or abdominal cramping early in pregnancy.     For uncomplicated pregnancies, you will be seeing your doctor once a month until you are 28 weeks, then you will see your doctor twice a month. You will begin weekly visits at 36 weeks until you deliver.     If you have a medical emergency, please call 911.    [facility] Labor and Delivery: [phone]  [facility] Labor and Delivery: [phone]    When to call or come in to the hospital  If you notice a decrease in your baby's movement.   If your begin to experience contractions that are 5 minutes or less apart and lasting for over 45 seconds, or if contractions are becoming more painful.  If you have any bleeding or leakage of fluids.   If you have a headache not resolved with tylenol, right upper abdominal pain, or sudden onset of swelling.  You know your body best. Never hesitate to call or go to the hospital if something doesn't feel right!    Preparing for the hospital  You re likely feeling anxious as your child s birth approaches. This is normal. To give yourself some peace of mind, pack a bag 3-4 weeks before your due date. Here is a list of things to remember:  Personal care items like toothbrush, hair brush, lip balm, lotion, shampoo, glasses, contacts  Nightgown, bathrobe, slippers  Several pairs of underwear  Comfortable clothes for you to wear home  Camera with new batteries  Cell phone and   Insurance information and any other paperwork needed for your hospital stay  Clothes for baby to wear home  An infant, rear-facing car seat for bringing home your baby (this is required by law)  What Is Group B Strep?   Group B strep (streptococcus) is a common bacteria. It can grow in a woman s vagina, rectum, or urinary tract. It is almost always harmless in  adults. But in rare cases, a woman who has group B strep can infect her baby during the birth. Infection can cause serious illness in the . The good news is that treating the mother during labor reduces the risk of the baby becoming infected. And if a  is infected, the infection can be treated. You will be screened for Group B strep at 35-36 weeks gestation.  Facts about group B strep   Learning more about group B strep can help you understand how testing and treatment can help. Here are some basic facts about group B strep:   It is not a sexually transmitted disease.   It is not the same as strep throat. (This is caused by group A strep.)   It often has no symptoms and may cause no problems in adults.   Group B strep can be transmitted during vaginal delivery. It cannot be passed during  (surgical) birth.   A mother with group B strep rarely infects her . (Infection occurs only about 1% to 2% of the time.)   When a mother is treated during labor and delivery, her baby almost never becomes infected.   Certain factors during pregnancy increase the risk of a baby becoming infected.  Possible effects on your baby   Group B strep can infect the blood. It can also cause inflammation of the baby s lungs, brain, or spinal cord. Long-term effects can include blindness, deafness, mental retardation, or cerebral palsy. And in rare cases, infection causes death. Infection is most often detected soon after the baby is born.   How your baby may become infected   Group B strep often lives in the vagina or rectum. If the amniotic sac breaks early, bacteria from the vagina can travel to the uterus, reaching the baby. Or, as the baby passes through the birth canal, it can come in contact with the bacteria. In rare cases, group B strep can also be passed to the baby after delivery. This is called late-onset group B strep. The source of this type of infection is not well understood. But some experts  believe that it happens if the baby is exposed to group B strep in the home, from the parents or siblings, or in the community.   What increases the risk?   Certain risk factors increase the chance that a baby will be infected. They include:   Breaking or leaking of the amniotic sac earlier than 37 weeks gestation   Labor earlier than 37 weeks gestation   Breaking of the amniotic sac more than 18 hours before labor begins   Fever during labor   A urinary tract infection with group B strep at any point in the pregnancy   A previous baby born with a group B strep infection    [facility] Feeding in the Hospital: Information, Support and      Resources    As As you prepare for the birth of your child, you will want to consider options for feeding your baby cluding breast-feeding and/or baby formula. The American Academy of Pediatrics recommends exclusive breast-feeding for the first six months (although any amount of breast-feeding is beneficial).  However, we also understand that breast-feeding is  a personal choice and not for everyone. Whether or not you choose to breast-feed, your decision will be respected by our staff.        There are numerous benefits of breast-feeding; here are a few to consider:  Provides antibodies to protect your baby from infections and diseases  The cost: formula can cost over $1,500 per year  Convenience, no warming up or sterilizing bottles and supplies  The physical contact with breastfeeding can make babies feel secure, warm and comforted        What ever my feeding choice, what can I expect after I deliver my baby?  Your baby will usually be placed skin-to-skin immediately following birth. The skin to skin contact between you and your baby will be a special and memorable time. The bonding and attachment comforts your baby and has a positive effect on baby s brain development.   Having your baby  room in  with you also helps you start to learn your baby s body rhythms and sleep cycle.     You will also begin to learn your baby s cues (signals) that he or she is ready to feed.        When do I start to feed my baby?         As soon as possible after your baby s birth, you will be encouraged to begin feeding. In the first couple of weeks, your baby will eat often. Breastfeeding babies usually eat at least 8 times in 24 hours. Babies fed formula usually eat at least 7 times in 24 hours.           Breast-feeding tips:  Get comfortable and use pillows for support.  Have your baby at the level of your breast, facing you,  tummy to tummy.     Touch your nipple to your baby s lips so your baby s mouth opens wide (rooting reflex).  Aim the nipple toward the roof of your baby s mouth. When your baby opens his or her mouth, pull your baby toward your breast to help your baby  latch on  to your nipple and much of the areola area.  Hand expressing your breast milk can assist with latching your baby to your breast, if needed.  Ask for help, breastfeeding may seem awkward or uncomfortable at first, this is normal. There are numerous resources available at [facility], clinics and beyond.   If your goal is to exclusively breastfeed, avoid using any formula or artificial nipples (including bottles and pacifiers) while you and your baby are learning to breastfeed unless there is a medical reason.     Mixing breastfeeding and formula can interfere with how you begin building your milk supply. It can impact how you and your baby learn to breastfeeding together and alter the natural growth of  good  bacteria in your baby s stomach.  Breast-feeding tips (cont.)  Delay a pacifier or a bottle in the first few weeks until breastfeeding is well established. This is often around 3 weeks of age.  Ask your nurse to show you how to hand express. Breast milk can be kept in the refrigerator orfreezer for later use.     Hospital Resources:  [facility] Lactation Clinics located at [facility], [facility]  and [facility]  Call: [phone].  Inpatient support  Outpatient appointments  Telephone consultation  Breast-feeding classes available through [employer]      Online Resources:  [email or web address] sign up for free online weekly e-mail  [email or web address]  Breastfeedingmadesimple.com  [employer].Plannify (La Leche League)  Normalfed.com  [email or web address]  Workandpump.com    Breast-feeding Supplies & Pumps:  Talk to your insurance provider or WIC (Women, Infants and Children) to learn more about options available to you. Recent health insurance changes may include additional coverage for supplies and pumps.    Public Health:  Women, Infants and Children Nutrition program (WIC): provides breast-feeding support and education in addition to formal feeding moms. 001-ROS-3787 or [email or web address]    Family Health Home Visiting: [facility] Nurse home visits are available. Talk to your provider to see if you qualify. Most [facility] have a program available.    Additional Resources:  La Leche League is an international, nonprofit, nonsectarian organization offering information, education, and support to mothers who want to breast-feed their babies. Local groups offer phone help and monthly meetings. Visit [employer].Plannify or [place] and us the  Find local support  drop down menu or click on the  Resources  tab.    [place] Breastfeeding Resources: 2-325-366-BABY (6219) toll free    National Breastfeeding Help Line trained breastfeeding peer counselors can help answer common breast-feeding questions by phone. Monday-Friday: English/Estonian  7-162- 394-5596 toll free, 1-778.222.1453 (TTY)    Care Connection: [phone]-[facility] ([phone])

## 2024-09-26 NOTE — PROGRESS NOTES
Ambrose De La Rosa is a 39 year old  female who presents to clinic for a follow up OB visit. She is currently 36w2d with an estimated date of delivery of Oct 22, 2024 via LMP consistent with 18-week ultrasound. She denies headache, chest pain, shortness of breath, abdominal pain/contractions, vaginal bleeding, or abnormal discharge. She continues to feel baby move.     OB History    Para Term  AB Living   4 3 3 0 0 3   SAB IAB Ectopic Multiple Live Births   0 0 0 0 3      # Outcome Date GA Lbr Minesh/2nd Weight Sex Type Anes PTL Lv   4 Current            3 Term 19 37w6d 01:21 / 00:09 3.09 kg (6 lb 13 oz) F Vag-Spont None N SADAF      Name: ISA DE LA ROSA-PA      Apgar1: 9  Apgar5: 9   2 Term 10/02/17 40w3d 03:40 / 00:05 2.77 kg (6 lb 1.7 oz) F Vag-Spont None N SADAF      Complications: Fetal Intolerance      Name: ISA DE LA ROSA-PA      Apgar1: 9  Apgar5: 10   1 Term 16 39w5d 42:50 / 00:39 3.033 kg (6 lb 11 oz) F Vag-Spont Local, Nitrous N SADAF      Name: Noemi      Apgar1: 8  Apgar5: 9       Physical exam:  BP 97/54   Pulse 89   Temp 98.3  F (36.8  C) (Oral)   Resp 20   Wt 66.8 kg (147 lb 3.2 oz)   LMP 2024   SpO2 98%   BMI 31.85 kg/m      General: alert, female in no acute distress  Abdomen: gravid uterus appropriate for gestation age at 36 cm above pubic symphysis, non-tender, FHTs 135  Extremities: no edema    NST: baseline 135, +acc, -decel, category 1 tracing. No contractions     Prenatal labs  Blood type: B+  Hgb: 11.4-->12.1  Pap: deferred postpartum  Hep B, HIV, syphilisx2, gonorrhea, chlamydia, HIV negative  Rubella immune  UA/Ucx + GBS  NIPS nml, surprise gender  Fetal survey: EFW 48 percentile, placenta posterior/fundal, normal amniotic fluid  Elevated 1 hour GTT, passed 3-hour GTT  TDAP 8/15/24, RSV 24  Breast and formula  Nonpharm pain methods    Assessment/Plan:  1. Encounter for supervision of other normal pregnancy in third trimester  RSV vaccine today, influenza  vaccine next visit  Hemoglobin only next visit, GBS prophylaxis discussed below  Discussed pre-term labor signs and symptoms and when to call/come in.     2. Multigravida of advanced maternal age in third trimester  Normal 32-week growth ultrasound EFW 66%.  Weekly  testing recommended, category 1 tracing today.  Recommend delivery by 40 weeks.     3. GBS bacteriuria  <10k GBS bacteriuria on initial OB labs.  Plan for antibiotics during labor.       Follow up in 2 weeks for routine prenatal visit.     Jennifer Marie DO

## 2024-10-10 ENCOUNTER — PRENATAL OFFICE VISIT (OUTPATIENT)
Dept: FAMILY MEDICINE | Facility: CLINIC | Age: 39
End: 2024-10-10
Payer: COMMERCIAL

## 2024-10-10 VITALS
HEART RATE: 73 BPM | OXYGEN SATURATION: 98 % | DIASTOLIC BLOOD PRESSURE: 61 MMHG | WEIGHT: 150.02 LBS | RESPIRATION RATE: 15 BRPM | HEIGHT: 57 IN | TEMPERATURE: 98.1 F | BODY MASS INDEX: 32.37 KG/M2 | SYSTOLIC BLOOD PRESSURE: 115 MMHG

## 2024-10-10 DIAGNOSIS — O09.523 MULTIGRAVIDA OF ADVANCED MATERNAL AGE IN THIRD TRIMESTER: ICD-10-CM

## 2024-10-10 DIAGNOSIS — Z34.83 ENCOUNTER FOR SUPERVISION OF OTHER NORMAL PREGNANCY IN THIRD TRIMESTER: Primary | ICD-10-CM

## 2024-10-10 DIAGNOSIS — R82.71 GBS BACTERIURIA: ICD-10-CM

## 2024-10-10 LAB — HGB BLD-MCNC: 12.4 G/DL (ref 11.7–15.7)

## 2024-10-10 PROCEDURE — 36415 COLL VENOUS BLD VENIPUNCTURE: CPT | Performed by: FAMILY MEDICINE

## 2024-10-10 PROCEDURE — 91320 SARSCV2 VAC 30MCG TRS-SUC IM: CPT | Performed by: FAMILY MEDICINE

## 2024-10-10 PROCEDURE — 59025 FETAL NON-STRESS TEST: CPT | Performed by: FAMILY MEDICINE

## 2024-10-10 PROCEDURE — 99207 PR PRENATAL VISIT: CPT | Performed by: FAMILY MEDICINE

## 2024-10-10 PROCEDURE — 90480 ADMN SARSCOV2 VAC 1/ONLY CMP: CPT | Performed by: FAMILY MEDICINE

## 2024-10-10 PROCEDURE — 85018 HEMOGLOBIN: CPT | Performed by: FAMILY MEDICINE

## 2024-10-10 NOTE — PROGRESS NOTES
"  {PROVIDER CHARTING PREFERENCE:173312}    Subjective   Pa is a 39 year old, presenting for the following health issues:  Prenatal Care (38 weeks)        10/10/2024     2:31 PM   Additional Questions   Roomed by Sena YOUNGBLOOD     {MA/LPN/RN Pre-Provider Visit Orders- hCG/UA/Strep (Optional):267909}  {SUPERLIST (Optional):218957}  {additonal problems for provider to add (Optional):509362}    {ROS Picklists (Optional):699368}      Objective    /61   Pulse 73   Temp 98.1  F (36.7  C) (Oral)   Resp 15   Ht 1.448 m (4' 9\")   Wt 68 kg (150 lb 0.3 oz)   LMP 01/16/2024   SpO2 98%   BMI 32.46 kg/m    Body mass index is 32.46 kg/m .  Physical Exam   {Exam List (Optional):969622}    {Diagnostic Test Results (Optional):376335}        Signed Electronically by: Jennifer Marie DO  {Email feedback regarding this note to [email or web address]   :740680}  "

## 2024-10-10 NOTE — PATIENT INSTRUCTIONS
[facility] Information  If you have any further concerns or wish to schedule another appointment, please call our office at [phone].  Call your doctor if you experience any bleeding or abdominal cramping early in pregnancy.     For uncomplicated pregnancies, you will be seeing your doctor once a month until you are 28 weeks, then you will see your doctor twice a month. You will begin weekly visits at 36 weeks until you deliver.     If you have a medical emergency, please call 911.    [facility] Labor and Delivery: [phone]  [facility] Labor and Delivery: [phone]    When to call or come in to the hospital  If you notice a decrease in your baby's movement.   If your begin to experience contractions that are 5 minutes or less apart and lasting for over 45 seconds, or if contractions are becoming more painful.  If you have any bleeding or leakage of fluids.   If you have a headache not resolved with tylenol, right upper abdominal pain, or sudden onset of swelling.  You know your body best. Never hesitate to call or go to the hospital if something doesn't feel right!    Preparing for the hospital  You re likely feeling anxious as your child s birth approaches. This is normal. To give yourself some peace of mind, pack a bag 3-4 weeks before your due date. Here is a list of things to remember:  Personal care items like toothbrush, hair brush, lip balm, lotion, shampoo, glasses, contacts  Nightgown, bathrobe, slippers  Several pairs of underwear  Comfortable clothes for you to wear home  Camera with new batteries  Cell phone and   Insurance information and any other paperwork needed for your hospital stay  Clothes for baby to wear home  An infant, rear-facing car seat for bringing home your baby (this is required by law)

## 2024-10-10 NOTE — PROGRESS NOTES
"Ambrose De La Rosa is a 39 year old  female who presents to clinic for a follow up OB visit. She is currently 36w2d with an estimated date of delivery of Oct 22, 2024 via LMP consistent with 18-week ultrasound. She denies headache, chest pain, shortness of breath, vaginal bleeding, or abnormal discharge. She continues to feel baby move.     Had a few contractions over the past week.  Feels she would likely deliver early.    OB History    Para Term  AB Living   4 3 3 0 0 3   SAB IAB Ectopic Multiple Live Births   0 0 0 0 3      # Outcome Date GA Lbr Minesh/2nd Weight Sex Type Anes PTL Lv   4 Current            3 Term 19 37w6d 01:21 / 00:09 3.09 kg (6 lb 13 oz) F Vag-Spont None N SADAF      Name: ISA DE LA ROSA-PA      Apgar1: 9  Apgar5: 9   2 Term 10/02/17 40w3d 03:40 / 00:05 2.77 kg (6 lb 1.7 oz) F Vag-Spont None N SADAF      Complications: Fetal Intolerance      Name: ISA DE LA ROSA-PA      Apgar1: 9  Apgar5: 10   1 Term 16 39w5d 42:50 / 00:39 3.033 kg (6 lb 11 oz) F Vag-Spont Local, Nitrous N SADAF      Name: Noemi      Apgar1: 8  Apgar5: 9       Physical exam:  /61   Pulse 73   Temp 98.1  F (36.7  C) (Oral)   Resp 15   Ht 1.448 m (4' 9\")   Wt 68 kg (150 lb 0.3 oz)   LMP 2024   SpO2 98%   BMI 32.46 kg/m      General: alert, female in no acute distress  Abdomen: gravid uterus appropriate for gestation age at 37 cm above pubic symphysis, non-tender, FHTs 160,  Vertex on bedside ultrasound  Extremities: no edema    NST: 160 baseline, +acc, -decel, category 1 tracing. No contractions     Prenatal labs  Blood type: B+  Hgb: 11.4-->12.1  Pap: deferred postpartum  Hep B, HIV, syphilisx2, gonorrhea, chlamydia, HIV negative  Rubella immune  UA/Ucx + GBS  NIPS nml, surprise gender  Fetal survey: EFW 48 percentile, placenta posterior/fundal, normal amniotic fluid  Elevated 1 hour GTT, passed 3-hour GTT  TDAP 8/15/24, RSV 24, influenza 24, covid 10/10/24  Breast and formula  Nonpharm " pain methods    Assessment/Plan:  1. Encounter for supervision of other normal pregnancy in third trimester  Update COVID-vaccine and hemoglobin today  Discussed labor precautions and when to present to L&D    2. Multigravida of advanced maternal age in third trimester  Normal 32-week growth ultrasound EFW 66%.  Weekly  testing recommended, category 1 tracing today.  Recommend delivery by 40 weeks.     3. GBS bacteriuria  <10k GBS bacteriuria on initial OB labs.  Plan for antibiotics during labor.       Follow up in 1 week for routine prenatal visit.     Jennifer Marie DO

## 2024-10-16 ENCOUNTER — TELEPHONE (OUTPATIENT)
Dept: FAMILY MEDICINE | Facility: CLINIC | Age: 39
End: 2024-10-16
Payer: COMMERCIAL

## 2024-10-16 NOTE — CONFIDENTIAL NOTE
at 39 weeks 1 day.  Called and spoke to patient and her  since not scheduled this week for routine OB visit.  Attempted to schedule patient in the next 3 days for visit, though declined due to personal scheduling conflicts.  Discussed she should be seen once weekly given her gestational age.  Baby is active, no vaginal bleeding or routine contractions.  Discussed strict return precautions and when to present to L&D.  They are willing to schedule for Monday, 10/21/2024 in case she does not go into labor over the weekend, and we will plan induction at that time.  Recommended delivery by 40 weeks given maternal age.    Jennifer Marie DO

## 2024-10-18 ENCOUNTER — HOSPITAL ENCOUNTER (INPATIENT)
Facility: HOSPITAL | Age: 39
LOS: 1 days | Discharge: HOME | End: 2024-10-19
Attending: FAMILY MEDICINE | Admitting: FAMILY MEDICINE
Payer: COMMERCIAL

## 2024-10-18 ENCOUNTER — OFFICE VISIT (OUTPATIENT)
Dept: INTERPRETER SERVICES | Facility: CLINIC | Age: 39
End: 2024-10-18

## 2024-10-18 PROBLEM — Z36.89 ENCOUNTER FOR TRIAGE IN PREGNANT PATIENT: Status: RESOLVED | Noted: 2024-10-18 | Resolved: 2024-10-18

## 2024-10-18 PROBLEM — O26.22 PREG CARE FOR PATIENT W RECUR PREG LOSS, SECOND TRIMESTER: Status: ACTIVE | Noted: 2024-10-18

## 2024-10-18 PROBLEM — Z34.90 PREGNANCY: Status: ACTIVE | Noted: 2024-10-18

## 2024-10-18 PROBLEM — Z34.90 PREGNANCY: Status: RESOLVED | Noted: 2024-10-18 | Resolved: 2024-10-18

## 2024-10-18 PROBLEM — Z36.89 ENCOUNTER FOR TRIAGE IN PREGNANT PATIENT: Status: ACTIVE | Noted: 2024-10-18

## 2024-10-18 PROBLEM — O26.22 PREG CARE FOR PATIENT W RECUR PREG LOSS, SECOND TRIMESTER: Status: RESOLVED | Noted: 2024-10-18 | Resolved: 2024-10-18

## 2024-10-18 LAB
ABO/RH(D): NORMAL
ANTIBODY SCREEN: NEGATIVE
HGB BLD-MCNC: 13.4 G/DL (ref 11.7–15.7)
SPECIMEN EXPIRATION DATE: NORMAL
T PALLIDUM AB SER QL: NONREACTIVE

## 2024-10-18 PROCEDURE — 86900 BLOOD TYPING SEROLOGIC ABO: CPT | Performed by: FAMILY MEDICINE

## 2024-10-18 PROCEDURE — 86901 BLOOD TYPING SEROLOGIC RH(D): CPT | Performed by: FAMILY MEDICINE

## 2024-10-18 PROCEDURE — 722N000001 HC LABOR CARE VAGINAL DELIVERY SINGLE

## 2024-10-18 PROCEDURE — 250N000011 HC RX IP 250 OP 636: Performed by: FAMILY MEDICINE

## 2024-10-18 PROCEDURE — 36415 COLL VENOUS BLD VENIPUNCTURE: CPT | Performed by: FAMILY MEDICINE

## 2024-10-18 PROCEDURE — 258N000003 HC RX IP 258 OP 636: Performed by: FAMILY MEDICINE

## 2024-10-18 PROCEDURE — 10907ZC DRAINAGE OF AMNIOTIC FLUID, THERAPEUTIC FROM PRODUCTS OF CONCEPTION, VIA NATURAL OR ARTIFICIAL OPENING: ICD-10-PCS | Performed by: FAMILY MEDICINE

## 2024-10-18 PROCEDURE — 86780 TREPONEMA PALLIDUM: CPT | Performed by: FAMILY MEDICINE

## 2024-10-18 PROCEDURE — 120N000001 HC R&B MED SURG/OB

## 2024-10-18 PROCEDURE — 85018 HEMOGLOBIN: CPT | Performed by: FAMILY MEDICINE

## 2024-10-18 PROCEDURE — 59400 OBSTETRICAL CARE: CPT | Performed by: FAMILY MEDICINE

## 2024-10-18 PROCEDURE — T1013 SIGN LANG/ORAL INTERPRETER: HCPCS | Mod: U3 | Performed by: INTERPRETER

## 2024-10-18 PROCEDURE — 250N000009 HC RX 250: Performed by: FAMILY MEDICINE

## 2024-10-18 PROCEDURE — 250N000013 HC RX MED GY IP 250 OP 250 PS 637: Performed by: FAMILY MEDICINE

## 2024-10-18 RX ORDER — LOPERAMIDE HYDROCHLORIDE 2 MG/1
4 CAPSULE ORAL
Status: DISCONTINUED | OUTPATIENT
Start: 2024-10-18 | End: 2024-10-19 | Stop reason: HOSPADM

## 2024-10-18 RX ORDER — OXYTOCIN/0.9 % SODIUM CHLORIDE 30/500 ML
340 PLASTIC BAG, INJECTION (ML) INTRAVENOUS CONTINUOUS PRN
Status: DISCONTINUED | OUTPATIENT
Start: 2024-10-18 | End: 2024-10-18 | Stop reason: HOSPADM

## 2024-10-18 RX ORDER — METOCLOPRAMIDE HYDROCHLORIDE 5 MG/ML
10 INJECTION INTRAMUSCULAR; INTRAVENOUS EVERY 6 HOURS PRN
Status: DISCONTINUED | OUTPATIENT
Start: 2024-10-18 | End: 2024-10-18 | Stop reason: HOSPADM

## 2024-10-18 RX ORDER — NALOXONE HYDROCHLORIDE 0.4 MG/ML
0.4 INJECTION, SOLUTION INTRAMUSCULAR; INTRAVENOUS; SUBCUTANEOUS
Status: DISCONTINUED | OUTPATIENT
Start: 2024-10-18 | End: 2024-10-18 | Stop reason: HOSPADM

## 2024-10-18 RX ORDER — LIDOCAINE 40 MG/G
CREAM TOPICAL
Status: DISCONTINUED | OUTPATIENT
Start: 2024-10-18 | End: 2024-10-18 | Stop reason: HOSPADM

## 2024-10-18 RX ORDER — NALOXONE HYDROCHLORIDE 0.4 MG/ML
0.2 INJECTION, SOLUTION INTRAMUSCULAR; INTRAVENOUS; SUBCUTANEOUS
Status: DISCONTINUED | OUTPATIENT
Start: 2024-10-18 | End: 2024-10-18 | Stop reason: HOSPADM

## 2024-10-18 RX ORDER — PROCHLORPERAZINE MALEATE 10 MG
10 TABLET ORAL EVERY 6 HOURS PRN
Status: DISCONTINUED | OUTPATIENT
Start: 2024-10-18 | End: 2024-10-18 | Stop reason: HOSPADM

## 2024-10-18 RX ORDER — ACETAMINOPHEN 325 MG/1
650 TABLET ORAL EVERY 4 HOURS PRN
Status: DISCONTINUED | OUTPATIENT
Start: 2024-10-18 | End: 2024-10-18 | Stop reason: HOSPADM

## 2024-10-18 RX ORDER — METHYLERGONOVINE MALEATE 0.2 MG/ML
200 INJECTION INTRAVENOUS
Status: DISCONTINUED | OUTPATIENT
Start: 2024-10-18 | End: 2024-10-18 | Stop reason: HOSPADM

## 2024-10-18 RX ORDER — MISOPROSTOL 200 UG/1
800 TABLET ORAL
Status: DISCONTINUED | OUTPATIENT
Start: 2024-10-18 | End: 2024-10-18 | Stop reason: HOSPADM

## 2024-10-18 RX ORDER — FENTANYL CITRATE 50 UG/ML
100 INJECTION, SOLUTION INTRAMUSCULAR; INTRAVENOUS
Status: DISCONTINUED | OUTPATIENT
Start: 2024-10-18 | End: 2024-10-18 | Stop reason: HOSPADM

## 2024-10-18 RX ORDER — LOPERAMIDE HYDROCHLORIDE 2 MG/1
2 CAPSULE ORAL
Status: DISCONTINUED | OUTPATIENT
Start: 2024-10-18 | End: 2024-10-18 | Stop reason: HOSPADM

## 2024-10-18 RX ORDER — OXYTOCIN 10 [USP'U]/ML
10 INJECTION, SOLUTION INTRAMUSCULAR; INTRAVENOUS
Status: DISCONTINUED | OUTPATIENT
Start: 2024-10-18 | End: 2024-10-18 | Stop reason: HOSPADM

## 2024-10-18 RX ORDER — TRANEXAMIC ACID 10 MG/ML
1 INJECTION, SOLUTION INTRAVENOUS EVERY 30 MIN PRN
Status: DISCONTINUED | OUTPATIENT
Start: 2024-10-18 | End: 2024-10-19 | Stop reason: HOSPADM

## 2024-10-18 RX ORDER — ONDANSETRON 2 MG/ML
4 INJECTION INTRAMUSCULAR; INTRAVENOUS EVERY 6 HOURS PRN
Status: DISCONTINUED | OUTPATIENT
Start: 2024-10-18 | End: 2024-10-18 | Stop reason: HOSPADM

## 2024-10-18 RX ORDER — MISOPROSTOL 200 UG/1
800 TABLET ORAL
Status: DISCONTINUED | OUTPATIENT
Start: 2024-10-18 | End: 2024-10-19 | Stop reason: HOSPADM

## 2024-10-18 RX ORDER — KETOROLAC TROMETHAMINE 30 MG/ML
30 INJECTION, SOLUTION INTRAMUSCULAR; INTRAVENOUS
Status: COMPLETED | OUTPATIENT
Start: 2024-10-18 | End: 2024-10-18

## 2024-10-18 RX ORDER — PENICILLIN G POTASSIUM 5000000 [IU]/1
5 INJECTION, POWDER, FOR SOLUTION INTRAMUSCULAR; INTRAVENOUS ONCE
Status: COMPLETED | OUTPATIENT
Start: 2024-10-18 | End: 2024-10-18

## 2024-10-18 RX ORDER — MISOPROSTOL 200 UG/1
400 TABLET ORAL
Status: DISCONTINUED | OUTPATIENT
Start: 2024-10-18 | End: 2024-10-18 | Stop reason: HOSPADM

## 2024-10-18 RX ORDER — OXYTOCIN/0.9 % SODIUM CHLORIDE 30/500 ML
100-340 PLASTIC BAG, INJECTION (ML) INTRAVENOUS CONTINUOUS PRN
Status: DISCONTINUED | OUTPATIENT
Start: 2024-10-18 | End: 2024-10-19 | Stop reason: HOSPADM

## 2024-10-18 RX ORDER — SODIUM CHLORIDE, SODIUM LACTATE, POTASSIUM CHLORIDE, CALCIUM CHLORIDE 600; 310; 30; 20 MG/100ML; MG/100ML; MG/100ML; MG/100ML
INJECTION, SOLUTION INTRAVENOUS CONTINUOUS
Status: DISCONTINUED | OUTPATIENT
Start: 2024-10-18 | End: 2024-10-18

## 2024-10-18 RX ORDER — LOPERAMIDE HYDROCHLORIDE 2 MG/1
2 CAPSULE ORAL
Status: DISCONTINUED | OUTPATIENT
Start: 2024-10-18 | End: 2024-10-19 | Stop reason: HOSPADM

## 2024-10-18 RX ORDER — MISOPROSTOL 200 UG/1
400 TABLET ORAL
Status: DISCONTINUED | OUTPATIENT
Start: 2024-10-18 | End: 2024-10-19 | Stop reason: HOSPADM

## 2024-10-18 RX ORDER — IBUPROFEN 800 MG/1
800 TABLET, FILM COATED ORAL
Status: COMPLETED | OUTPATIENT
Start: 2024-10-18 | End: 2024-10-18

## 2024-10-18 RX ORDER — TRANEXAMIC ACID 10 MG/ML
1 INJECTION, SOLUTION INTRAVENOUS EVERY 30 MIN PRN
Status: DISCONTINUED | OUTPATIENT
Start: 2024-10-18 | End: 2024-10-18 | Stop reason: HOSPADM

## 2024-10-18 RX ORDER — ACETAMINOPHEN 325 MG/1
650 TABLET ORAL EVERY 4 HOURS PRN
Status: DISCONTINUED | OUTPATIENT
Start: 2024-10-18 | End: 2024-10-19 | Stop reason: HOSPADM

## 2024-10-18 RX ORDER — HYDROCORTISONE 25 MG/G
CREAM TOPICAL 3 TIMES DAILY PRN
Status: DISCONTINUED | OUTPATIENT
Start: 2024-10-18 | End: 2024-10-19 | Stop reason: HOSPADM

## 2024-10-18 RX ORDER — LOPERAMIDE HYDROCHLORIDE 2 MG/1
4 CAPSULE ORAL
Status: DISCONTINUED | OUTPATIENT
Start: 2024-10-18 | End: 2024-10-18 | Stop reason: HOSPADM

## 2024-10-18 RX ORDER — OXYTOCIN 10 [USP'U]/ML
10 INJECTION, SOLUTION INTRAMUSCULAR; INTRAVENOUS
Status: DISCONTINUED | OUTPATIENT
Start: 2024-10-18 | End: 2024-10-19 | Stop reason: HOSPADM

## 2024-10-18 RX ORDER — METHYLERGONOVINE MALEATE 0.2 MG/ML
200 INJECTION INTRAVENOUS
Status: DISCONTINUED | OUTPATIENT
Start: 2024-10-18 | End: 2024-10-19 | Stop reason: HOSPADM

## 2024-10-18 RX ORDER — CARBOPROST TROMETHAMINE 250 UG/ML
250 INJECTION, SOLUTION INTRAMUSCULAR
Status: DISCONTINUED | OUTPATIENT
Start: 2024-10-18 | End: 2024-10-18 | Stop reason: HOSPADM

## 2024-10-18 RX ORDER — MODIFIED LANOLIN
OINTMENT (GRAM) TOPICAL
Status: DISCONTINUED | OUTPATIENT
Start: 2024-10-18 | End: 2024-10-19 | Stop reason: HOSPADM

## 2024-10-18 RX ORDER — BISACODYL 10 MG
10 SUPPOSITORY, RECTAL RECTAL DAILY PRN
Status: DISCONTINUED | OUTPATIENT
Start: 2024-10-18 | End: 2024-10-19 | Stop reason: HOSPADM

## 2024-10-18 RX ORDER — DOCUSATE SODIUM 100 MG/1
100 CAPSULE, LIQUID FILLED ORAL DAILY
Status: DISCONTINUED | OUTPATIENT
Start: 2024-10-18 | End: 2024-10-19 | Stop reason: HOSPADM

## 2024-10-18 RX ORDER — CARBOPROST TROMETHAMINE 250 UG/ML
250 INJECTION, SOLUTION INTRAMUSCULAR
Status: DISCONTINUED | OUTPATIENT
Start: 2024-10-18 | End: 2024-10-19 | Stop reason: HOSPADM

## 2024-10-18 RX ORDER — IBUPROFEN 800 MG/1
800 TABLET, FILM COATED ORAL EVERY 6 HOURS PRN
Status: DISCONTINUED | OUTPATIENT
Start: 2024-10-18 | End: 2024-10-19 | Stop reason: HOSPADM

## 2024-10-18 RX ORDER — CITRIC ACID/SODIUM CITRATE 334-500MG
30 SOLUTION, ORAL ORAL
Status: DISCONTINUED | OUTPATIENT
Start: 2024-10-18 | End: 2024-10-18 | Stop reason: HOSPADM

## 2024-10-18 RX ORDER — PENICILLIN G 3000000 [IU]/50ML
3 INJECTION, SOLUTION INTRAVENOUS EVERY 4 HOURS
Status: DISCONTINUED | OUTPATIENT
Start: 2024-10-18 | End: 2024-10-18 | Stop reason: HOSPADM

## 2024-10-18 RX ORDER — METOCLOPRAMIDE 10 MG/1
10 TABLET ORAL EVERY 6 HOURS PRN
Status: DISCONTINUED | OUTPATIENT
Start: 2024-10-18 | End: 2024-10-18 | Stop reason: HOSPADM

## 2024-10-18 RX ORDER — OXYTOCIN/0.9 % SODIUM CHLORIDE 30/500 ML
340 PLASTIC BAG, INJECTION (ML) INTRAVENOUS CONTINUOUS PRN
Status: DISCONTINUED | OUTPATIENT
Start: 2024-10-18 | End: 2024-10-19 | Stop reason: HOSPADM

## 2024-10-18 RX ORDER — ONDANSETRON 4 MG/1
4 TABLET, ORALLY DISINTEGRATING ORAL EVERY 6 HOURS PRN
Status: DISCONTINUED | OUTPATIENT
Start: 2024-10-18 | End: 2024-10-18 | Stop reason: HOSPADM

## 2024-10-18 RX ADMIN — DOCUSATE SODIUM 100 MG: 100 CAPSULE, LIQUID FILLED ORAL at 15:03

## 2024-10-18 RX ADMIN — PENICILLIN G 3 MILLION UNITS: 3000000 INJECTION, SOLUTION INTRAVENOUS at 13:44

## 2024-10-18 RX ADMIN — KETOROLAC TROMETHAMINE 30 MG: 30 INJECTION, SOLUTION INTRAMUSCULAR at 14:33

## 2024-10-18 RX ADMIN — SODIUM CHLORIDE, POTASSIUM CHLORIDE, SODIUM LACTATE AND CALCIUM CHLORIDE: 600; 310; 30; 20 INJECTION, SOLUTION INTRAVENOUS at 09:47

## 2024-10-18 RX ADMIN — Medication 340 ML/HR: at 14:04

## 2024-10-18 RX ADMIN — PENICILLIN G POTASSIUM 5 MILLION UNITS: 5000000 POWDER, FOR SOLUTION INTRAMUSCULAR; INTRAPLEURAL; INTRATHECAL; INTRAVENOUS at 09:53

## 2024-10-18 ASSESSMENT — ACTIVITIES OF DAILY LIVING (ADL)
ADLS_ACUITY_SCORE: 23
ADLS_ACUITY_SCORE: 23
ADLS_ACUITY_SCORE: 18
ADLS_ACUITY_SCORE: 18
ADLS_ACUITY_SCORE: 22
ADLS_ACUITY_SCORE: 18
ADLS_ACUITY_SCORE: 23
ADLS_ACUITY_SCORE: 18
ADLS_ACUITY_SCORE: 22
ADLS_ACUITY_SCORE: 22
ADLS_ACUITY_SCORE: 23

## 2024-10-18 NOTE — H&P
OB Admission H&P  Date: 10/18/2024  Time: 10:13 AM  Ambrose De La Rosa,  1985, MRN [id number]    CC: contractions    HPI: Ambrose De La Rosa is a 39 year old year old  at 39w3d weeks by LMP c/w 18 week US presenting with regular and painful contractions since 730.  She denies leakage of fluid and vaginal bleeding and reports good fetal movement. Coping well through contractions. Spouse and daughter at bedside.    Prenatal Complications:  AMA    OB/Gyn History  OB History    Para Term  AB Living   4 3 3 0 0 3   SAB IAB Ectopic Multiple Live Births   0 0 0 0 3      # Outcome Date GA Lbr Minesh/2nd Weight Sex Type Anes PTL Lv   4 Current            3 Term 19 37w6d 01:21 / 00:09 3.09 kg (6 lb 13 oz) F Vag-Spont None N SADAF      Name: ISA DE LA ROSA-PA      Apgar1: 9  Apgar5: 9   2 Term 10/02/17 40w3d 03:40 / 00:05 2.77 kg (6 lb 1.7 oz) F Vag-Spont None N SADAF      Complications: Fetal Intolerance      Name: ISA DE LA ROSA-PA      Apgar1: 9  Apgar5: 10   1 Term 16 39w5d 42:50 / 00:39 3.033 kg (6 lb 11 oz) F Vag-Spont Local, Nitrous N SADAF      Name: Noemi      Apgar1: 8  Apgar5: 9       Physical Exam:  /60 (BP Location: Right arm, Patient Position: Semi-Yuen's, Cuff Size: Adult Regular)   Temp 98.3  F (36.8  C) (Oral)   Resp 18   LMP 2024     Gen: no acute distress, resting comfortably   CV: acyanotic   Pulm: unlabored respirations   Abd: gravid, soft, nontender   Extremities: soft, nontender    Cervical Exam: 5.5/90/-2 by nursing exam. Verified vertex on bedside US by myself.  FHR: 135, min ahmet, +accels, -decels  [place]:  q2.5-4.5min    Prenatal labs  Blood type: B+  Hgb: 11.4-->12.1  Pap: deferred postpartum  Hep B, HIV, syphilisx2, gonorrhea, chlamydia, HIV negative  Rubella immune  UA/Ucx + GBS  NIPS nml, girl!  Fetal survey: EFW 48 percentile, placenta posterior/fundal, normal amniotic fluid  Elevated 1 hour GTT, passed 3-hour GTT  TDAP 8/15/24, RSV 24, influenza 24, covid  10/10/24  Breast and formula  Nonpharm pain methods    Impression:  Ambrose De La Rosa is a 39 year old year old  at 39w3d weeks admitted for active labor, pregnancy complicated by AMA and possible prior PPH.    Plan:   Admit to L&D  Begin antibiotic GBS ppx  Prepare for PPH  Anticipate     Jennifer Marie DO  10/18/2024, 10:13 AM

## 2024-10-18 NOTE — PLAN OF CARE
"  Problem: Adult Inpatient Plan of Care  Goal: Patient-Specific Goal (Individualized)  Description: You can add care plan individualizations to a care plan. Examples of Individualization might be:  \"Parent requests to be called daily at 9am for status\", \"I have a hard time hearing out of my right ear\", or \"Do not touch me to wake me up as it startles  me\".  Outcome: Progressing  Flowsheets (Taken 10/18/2024 5862)  Individualized Care Needs: +GBS, history of 3rd stage PPH  Anxieties, Fears or Concerns: none  Patient/Family-Specific Goals (Include Timeframe): delivery in next 6 hours   Goal Outcome Evaluation:      Plan of Care Reviewed With: patient, significant other    Overall Patient Progress: improvingOverall Patient Progress: improving    Outcome Evaluation: admission for onset of contractions and labor    IV access established and GBS prophylaxis initiated. Discuss plan for prevention of PPH with Dr Marie and Pa at bedside. Supported Pa in her request for comfort measures in labor limited to nonpharmacologic comfort measures.   "

## 2024-10-18 NOTE — PLAN OF CARE
"Goal Outcome Evaluation:      Plan of Care Reviewed With: patient, significant other    Overall Patient Progress: improvingOverall Patient Progress: improving     Problem: Adult Inpatient Plan of Care  Goal: Plan of Care Review  Outcome: Progressing  Flowsheets (Taken 10/18/2024 9321)  Plan of Care Reviewed With:   patient   significant other  Overall Patient Progress: improving     Problem: Postpartum (Vaginal Delivery)  Goal: Optimal Pain Control and Function   Outcome: Progressing  Problem: Postpartum (Vaginal Delivery)  Goal: Effective Urinary Elimination  Outcome: Progressing     Vital signs stable. Fundus firm. Lochia WDL.   Passed voiding trials. Voided 175mL at 1600 and 400mL at 1751.   Denies pain and declines pain medication at this time.   Declines use of Tucks and Dermoplast.   Breast and formula feeding . Speaks positively of  \"Reserve\".   Reviewed plan of care with patient.   "

## 2024-10-18 NOTE — PROGRESS NOTES
D:  Patient admitted to [facility]/TRZ[facility]23-5 via wheelchair with  and support person Candace.  A:  Bedside report received from Niru COSME and Katrina CONNER Oriented patient and family to surroundings; call light within reach. 4 Part ID bands double checked with reporting RN.  R:  Patient and  tolerated transfer. Care assumed.

## 2024-10-18 NOTE — PROGRESS NOTES
[place] [facility] [place] 0913[place] escorted to room 10 at 0917 via wheelchair. Nabil  via iPad at bedside. Here to rule out labor. Reports contraction onset at 0730 this morning. Denies leaking of fluid or vaginal bleeding.  Permission obtained to apply fetal monitors. Prenatal record reviewed- +GBS status, failed 1 hour GTT but passed 3 hour GTT and 3rd stage PPH. SVE as noted. Dr Marie informed of arrival and SVE by charge nurse, MERCEDES. IV access established. OB admission assessment and evaluation in progress. PCN prophylaxis for GBS initiated.

## 2024-10-18 NOTE — PROGRESS NOTES
Date: 10/18/2024  Time: 11:48 AM    Labor Progress Note    Subjective:  Ambrose De La Rosa is a 39 year old yo  who was admitted for for active labor. Coping well through contractions. In person  at bedside.      Objective:  Temp:  [98.3  F (36.8  C)-98.4  F (36.9  C)] 98.4  F (36.9  C)  Resp:  [18-20] 20  BP: (103-116)/(60-67) 116/67    SVE: 8/90%/-1 bulging bag    Membranes: are intact    FHT: Baseline: 135 bpm, Variability: minimal, Accelerations: present, Decelerations: absent    Contractions: Monitored by: EFM, irregular, every 2-4 minutes    Recent Results (from the past 24 hour(s))   Hemoglobin    Collection Time: 10/18/24 10:00 AM   Result Value Ref Range    Hemoglobin 13.4 11.7 - 15.7 g/dL   Adult Type and Screen    Collection Time: 10/18/24 10:00 AM   Result Value Ref Range    ABO/RH(D) B POS     Antibody Screen Negative Negative    SPECIMEN EXPIRATION DATE         Assessment/Plan:   at 39w3d weeks in active labor with appropriate cervical change. Initial dose of penicillin in for GBS+. Bulging bag noted on cervical exam but due to space between fetal head and maternal left cervix unable to safely perform amniotomy so will continue to encourage position changes. Infant fluctuating between minimal and moderate variable, though continues to have accels and no decels so will continue towards .    Jennifer Marie,     This chart is completed utilizing dictation software; typos and/or incorrect word substitutions may unintentionally occur.

## 2024-10-18 NOTE — PROGRESS NOTES
Data: Ambrose De La Rosa transferred to [facility]/KPHE29-4 via wheelchair. Patient transferred to Postpartum with .    Action: Receiving unit notified of transfer. Patient and support person notified of room change. Patient and belongings accompanied by Registered Nurse. Bedside report given to Rosalina Gutiérrez RN at 1430 and OB focused assessment/fundal check performed with receiving RN. Oriented patient to surroundings by AK.  Call light within reach.    Response: Patient tolerated transfer.    Katarina Gao RN  10/18/2024 4:35 PM

## 2024-10-18 NOTE — PROGRESS NOTES
Coping well with contractions with minimal coaching with breathing and relaxation. Reviewed nonpharmacologic comfort measures available to her for managing pain in labor. Prefers to remain in bed at this time and declining comfort measures other than decreasing lighting and stimuli in room, minimizing interruptions and clustering cares.

## 2024-10-18 NOTE — L&D DELIVERY NOTE
OB Vaginal Delivery Note    Ambrose De La Rosa MRN# [id number]   Age: 39 year old YOB: 1985       GA: 39w3d  GP:   Labor Complications: None   EBL: 100  mL  Delivery QBL: 100 mL  Delivery Type: Vaginal, Spontaneous   ROM to Delivery Time: (Delivered) Hours: 1 Minutes: 48  [place] Weight: 3.12 kg (6 lb 14.1 oz)    1 Minute 5 Minute 10 Minute   Apgar Totals: 9   9        ANNMARIE PATINO;YUMIKO CARRILLO     Delivery Details:  Ambrose De La Rosa, a 39 year old  female delivered a viable infant with apgars of 9  and 9 .  Patient began involuntarily pushing though continued to have right anterior lip of cervix which was easily retracted with maternal pushing efforts.  Patient was fully dilated and pushing after 2 hours 23 minutes in active labor. Delivery was via vaginal, spontaneous  to a sterile field under no anesthesia. Infant delivered in vertex  left  occiput  anterior  position. Anterior and posterior shoulders delivered without difficulty. The cord was clamped, cut twice and 3 vessels 3 vessels were noted. Cord blood segment was collected.  Patient noted to have an anterior mass in the vaginal vault, suspect bladder prolapse when viewed with a speculum. She deferred straight catheterization so will plan to reassess following a voiding trial. No active signs of bleeding and no hematoma suspected.    Cord complications: none   Placenta delivered at 10/18/2024  2:12 PM . Placental disposition was Hospital disposal . Fundal massage performed and fundus found to be firm.     Episiotomy: none    Perineum, vagina, cervix were inspected, and the following lacerations were noted:   Perineal lacerations: none        Infant and patient in delivery room in good and stable condition.        Estrella Female-Pa [[id number]]      Labor Event Times      Latent labor onset date/time: 10/18/2024 0730    Active labor onset date: 10/18/24 Onset time: 11:36 AM   Dilation complete date: 10/18/24 Complete time:  1:59 PM    Start pushing date/time: 10/18/2024 1358          Labor Events     labor?: No   steroids: None  Labor Type: Spontaneous  Predominate monitoring during 1st stage: continuous electronic fetal monitoring     Antibiotics received during labor?: Yes  Reason for Antibiotics: GBS  Antibiotics received for GBS: Penicillin  Antibiotics Given (GBS): Greater than 4 hours prior to delivery       Rupture date/time: 10/18/24 1216   Rupture type: Artificial Rupture of Membranes, Spontaneous Rupture of Membranes  Fluid color: Clear  Fluid odor: Normal     Augmentation: AROM  Indications for augmentation: Ineffective Contraction Pattern       Delivery/Placenta Date and Time      Delivery Date: 10/18/24 Delivery Time:  2:04 PM   Placenta Date/Time: 10/18/2024  2:12 PM  Oxytocin given at the time of delivery: after delivery of baby  Delivering clinician: Jennifer Marie,    Other personnel present at delivery:  Provider Role   Katarina Ornelas RN Delivery Nurse   Annika Carrillo RN Registered Nurse             Vaginal Counts       Initial count performed by 2 team members:  Two Team Members   Frank Ornelas         Needles Suture Needles Sponges (RETIRED) Instruments   Initial counts 0 0 5    Added to count       Relief counts       Final counts 0 0 5            Placed during labor Accounted for at the end of labor   FSE NA NA   IUPC NA NA   Cervidil NA NA                  Final count performed by 2 team members:  Two Team Members   Frank Ornelas      Final count correct?: Yes  Pre-Birth Team Brief: Complete  Post-Birth Team Debrief: Complete       Apgars    Living status: Living   1 Minute 5 Minute 10 Minute 15 Minute 20 Minute   Skin color: 1  1       Heart rate: 2  2       Reflex irritability: 2  2       Muscle tone: 2  2       Respiratory effort: 2  2       Total: 9  9       Apgars assigned by: HAILEY CARRILLO       Cord      Vessels: 3 Vessels    Cord Complications: None               Gases Sent?: No  "   Delayed cord clamping?: Yes    Cord Clamping Delay (seconds):  seconds           Spangler Resuscitation    Methods: None  Output in Delivery Room: Stool       Spangler Measurements      Weight: 6 lb 14.1 oz Length: 1' 7.75\"     Head circumference: 12.5 cm    Output in delivery room: Stool       Skin to Skin and Feeding Plan      Skin to skin initiation date/time: 1/10/1841    Skin to skin with: Mother  Skin to skin end date/time: 1/10/1841           Labor Events and Shoulder Dystocia    Fetal Tracing Prior to Delivery: Category 1  Shoulder dystocia present?: Neg       Delivery (Maternal) (Provider to Complete) (571635)    Episiotomy: None  Perineal lacerations: None    Est. blood loss (mL): 100  Genital tract inspection done: Pos       Blood Loss  Mother: Ambrose De La Rosa #[id number]     Start of Mother's Information      Delivery Blood Loss  10/18/24 1136 - 10/18/24 1433      EBL (mL) Hospital Encounter 100 mL    Delivery QBL (mL) Hospital Encounter 100 mL    Total  200 mL               End of Mother's Information  Mother: Ambrose De La Rosa #[id number]                Delivery - Provider to Complete (089942)    Delivering clinician: Jennifer Marie DO  Delivery Type (Choose the 1 that will go to the Birth History): Vaginal, Spontaneous                         Other personnel:  Provider Role   Katarina Gao RN Delivery Nurse   Annika Cruz RN Registered Nurse                    Placenta    Date/Time: 10/18/2024  2:12 PM  Removal: Spontaneous  Disposition: Hospital disposal             Anesthesia    Method: None                    Presentation and Position    Presentation: Vertex    Position: Left Occiput Anterior                     Jennifer Marie DO    "

## 2024-10-19 ENCOUNTER — VIRTUAL VISIT (OUTPATIENT)
Dept: INTERPRETER SERVICES | Facility: CLINIC | Age: 39
End: 2024-10-19
Payer: COMMERCIAL

## 2024-10-19 VITALS
DIASTOLIC BLOOD PRESSURE: 76 MMHG | SYSTOLIC BLOOD PRESSURE: 105 MMHG | RESPIRATION RATE: 16 BRPM | WEIGHT: 138.9 LBS | BODY MASS INDEX: 29.96 KG/M2 | TEMPERATURE: 98.2 F | OXYGEN SATURATION: 100 % | HEART RATE: 55 BPM | HEIGHT: 57 IN

## 2024-10-19 PROCEDURE — T1013 SIGN LANG/ORAL INTERPRETER: HCPCS | Mod: U4,TEL,95 | Performed by: INTERPRETER

## 2024-10-19 PROCEDURE — 250N000013 HC RX MED GY IP 250 OP 250 PS 637: Performed by: FAMILY MEDICINE

## 2024-10-19 RX ADMIN — DOCUSATE SODIUM 100 MG: 100 CAPSULE, LIQUID FILLED ORAL at 08:25

## 2024-10-19 ASSESSMENT — ACTIVITIES OF DAILY LIVING (ADL)
ADLS_ACUITY_SCORE: 22
ADLS_ACUITY_SCORE: 20
ADLS_ACUITY_SCORE: 22
ADLS_ACUITY_SCORE: 20
ADLS_ACUITY_SCORE: 22
ADLS_ACUITY_SCORE: 20
ADLS_ACUITY_SCORE: 22
ADLS_ACUITY_SCORE: 20
ADLS_ACUITY_SCORE: 22
ADLS_ACUITY_SCORE: 22
ADLS_ACUITY_SCORE: 20
ADLS_ACUITY_SCORE: 19

## 2024-10-19 NOTE — PLAN OF CARE
Goal Outcome Evaluation:      Plan of Care Reviewed With: patient    Overall Patient Progress: improvingOverall Patient Progress: improving    Outcome Evaluation: patient is stable postpartum, she desires discharge this evening, education, bc, rop, ppma completed. reviewed follow up appointment and warning signs to report to MD.

## 2024-10-19 NOTE — DISCHARGE SUMMARY
[facility]    Discharge Summary  Obstetrics    Date of Admission:  10/18/2024  Date of Discharge:  10/19/2024  Discharging Provider: Jennifer Marie DO    Discharge Diagnoses     GBS positive  Cystocele    History of Present Illness   Ambrose De La Rosa is a 39 year old female who presented in active labor s/p .    Hospital Course   The patient's hospital course was unremarkable.  She recovered as anticipated and experienced no post-delivery complications.  Suspected cystocele following birth of infant.  Plan to reassess at 4-week follow-up visit.  On discharge, her pain was well controlled. Vaginal bleeding is similar to peak menstrual flow.  Voiding without difficulty.  Ambulating well and tolerating a normal diet.  No fevers.  Breastfeeding and formula feeding well.  Infant is stable.  She was discharged on post-partum day #1 per mother request.    Post-partum hemoglobin:   Hemoglobin   Date Value Ref Range Status   10/18/2024 13.4 11.7 - 15.7 g/dL Final       [place] Depression Scale  Thoughts of Harming Self: Never  Total Score: 0      DO Sukhwinder Rowe Disposition   Discharged to home   Condition at discharge: Stable    Primary Care Physician   Genoveva Louis    Consultations This Hospital Stay   LACTATION IP CONSULT    Discharge Orders      Activity    Activity as tolerated     Reason for your hospital stay    Birth and postpartum care     Follow Up (2 and 6 weeks)    Follow up with provider in 4 weeks for post-delivery checks     Breast pump - Manual/Electric    Breast Pump Documentation:  Manual/Electric Pump: To support adequate breast milk production and nutrition for infant.     I, the undersigned, certify that the above prescribed supplies are medically necessary for this patient and is both reasonable and necessary in reference to accepted standards of medical and necessary in reference to accepted standards of medical practice in the treatment of this patient's  condition and is not prescribed as a convenience.     Diet    Resume previous diet     Discharge Medications   Current Discharge Medication List        CONTINUE these medications which have NOT CHANGED    Details   Prenatal Vit-Fe Fumarate-FA (PRENATAL MULTIVITAMIN W/IRON) 27-0.8 MG tablet Take 1 tablet by mouth daily  Qty: 90 tablet, Refills: 3    Associated Diagnoses: Encounter for supervision of other normal pregnancy in second trimester           Allergies   No Known Allergies

## 2024-10-19 NOTE — PLAN OF CARE
Plan of Care Reviewed With: patient    Overall Patient Progress: improvingOverall Patient Progress: improving    Outcome Evaluation: Patient is bonding with baby girl, up ad sonny, voiding without difficulty. VSS, assessment WNL. Bleeding has been scant-light, firm 2 below U. Needed reminders to feed baby overnight. Brought baby to breast once overnight, declined before other feedings. Reminded to feed every 3 hours, feeding every 4-4.5 hours. Educated on safe sleeping and co-sleeping, reinforced each time RN entered room.

## 2024-10-19 NOTE — LACTATION NOTE
This note was copied from a baby's chart.  Lactation Visit:      Hours since Delivery: 24 hours old.    Gestational Age at Delivery: 39.3 weeks.    Maternal Risk Factors to consider: OLGAA.    Visit: Since birth, infant has been to breast 4 times, has received 15-20mL of formula via bottle per feeding, has voided x4, stooled x3, and has had a weight loss of 3.7% (since delivery). Upon entering room, family appeared to be packed and ready to discharge soon.  offered , and family declined. Mother and FOB report they have no questions or concerns regarding breastfeeding or pumping.  offered pumping log; family declined. Encouraged family to call after discharge if they have questions or concerns regarding lactation, and that lactations number will be on infants discharge paperwork; family verbalized understanding.  congratulated family, and encouraged them to call LC back to room if questions arise prior to discharge.    Has Breast Pump for Home: Yes, Akash.

## 2024-10-19 NOTE — DISCHARGE INSTRUCTIONS
Warning Signs after Having a Baby    Keep this paper on your fridge or somewhere else where you can see it.    Call your provider if you have any of these symptoms up to 12 weeks after having your baby.    Thoughts of hurting yourself or your baby  Pain in your chest or trouble breathing  Severe headache not helped by pain medicine  Eyesight concerns (blurry vision, seeing spots or flashes of light, other changes to eyesight)  Fainting, shaking or other signs of a seizure    Call 9-1-1 if you feel that it is an emergency.     The symptoms below can happen to anyone after giving birth. They can be very serious. Call your provider if you have any of these warning signs.    My provider s phone number: _______________________    Losing too much blood (hemorrhage)    Call your provider if you soak through a pad in less than an hour or pass blood clots bigger than a golf ball. These may be signs that you are bleeding too much.    Blood clots in the legs or lungs    After you give birth, your body naturally clots its blood to help prevent blood loss. Sometimes this increased clotting can happen in other areas of the body, like the legs or lungs. This can block your blood flow and be very dangerous.     Call your provider if you:  Have a red, swollen spot on the back of your leg that is warm or painful when you touch it.   Are coughing up blood.     Infection    Call your provider if you have any of these symptoms:  Fever of 100.4 F (38 C) or higher.  Pain or redness around your stitches if you had an incision.   Any yellow, white, or green fluid coming from places where you had stitches or surgery.    Mood Problems (postpartum depression)    Many people feel sad or have mood changes after having a baby. But for some people, these mood swings are worse.     Call your provider right away if you feel so anxious or nervous that you can't care for yourself or your baby.    Preeclampsia (high blood pressure)    Even if you  didn't have high blood pressure when you were pregnant, you are at risk for the high blood pressure disease called preeclampsia. This risk can last up to 12 weeks after giving birth.     Call your provider if you have:   Pain on your right side under your rib cage  Sudden swelling in the hands and face    Remember: You know your body. If something doesn't feel right, get medical help.     For informational purposes only. Not to replace the advice of your health care provider. Copyright 2020 [facility]. All rights reserved. Clinically reviewed by vEelyn Hill, RNC-OB, MSN. Opara [id number] - Rev 02/23.

## 2024-10-19 NOTE — PLAN OF CARE
Goal Outcome Evaluation:  Discharge summary and education gone over with pt and her significant other in the room.  present via ipad. All questions and concerns were answered at this time. Pt will be discharging to home with infant in car seat.

## 2024-10-19 NOTE — PROVIDER NOTIFICATION
Dr. Marie notified of patient's blood pressure 89/50, P 71.  No lightheadedness or dizziness, bleeding is light, firm 1 below U.     Dr. Marie would like oral fluids encouraged, update provider if patient is symptomatic or systolic drops below 80.     Patient is voiding adequate amounts without difficulty.   Dr. Marie said she will assess patient's suspected bladder prolapse in the morning during rounding.

## 2024-11-19 ENCOUNTER — PRENATAL OFFICE VISIT (OUTPATIENT)
Dept: FAMILY MEDICINE | Facility: CLINIC | Age: 39
End: 2024-11-19
Payer: COMMERCIAL

## 2024-11-19 VITALS
TEMPERATURE: 98.4 F | WEIGHT: 125.4 LBS | HEART RATE: 59 BPM | HEIGHT: 56 IN | BODY MASS INDEX: 28.21 KG/M2 | RESPIRATION RATE: 14 BRPM | OXYGEN SATURATION: 100 % | DIASTOLIC BLOOD PRESSURE: 56 MMHG | SYSTOLIC BLOOD PRESSURE: 116 MMHG

## 2024-11-19 DIAGNOSIS — Z12.4 SCREENING FOR CERVICAL CANCER: ICD-10-CM

## 2024-11-19 DIAGNOSIS — Z30.011 ENCOUNTER FOR INITIAL PRESCRIPTION OF CONTRACEPTIVE PILLS: ICD-10-CM

## 2024-11-19 PROCEDURE — 99207 PR POST PARTUM EXAM: CPT | Performed by: FAMILY MEDICINE

## 2024-11-19 PROCEDURE — 87624 HPV HI-RISK TYP POOLED RSLT: CPT | Performed by: FAMILY MEDICINE

## 2024-11-19 RX ORDER — NORETHINDRONE ACETATE AND ETHINYL ESTRADIOL .03; 1.5 MG/1; MG/1
1 TABLET ORAL DAILY
Qty: 84 TABLET | Refills: 3 | Status: SHIPPED | OUTPATIENT
Start: 2024-11-19

## 2024-11-19 NOTE — PROGRESS NOTES
"Ambrose De La Rosa is a 39 year old  female presenting for routine postpartum follow up.    Date of delivery was 10/18/2024 via .  Complications noted during the pregnancy/delivery include GBS positive, treated with antibiotics.    Healing well, pain has subsided  Mood: denies concerns with postpartum blues/depression  Energy: good, no significant fatigue   Has resumed sexual activity   No return of period yet and no worrisome bleeding  Formula feeding    Other concerns today include none.    OB History    Para Term  AB Living   4 4 4 0 0 4   SAB IAB Ectopic Multiple Live Births   0 0 0 0 4      # Outcome Date GA Lbr Minesh/2nd Weight Sex Type Anes PTL Lv   4 Term 10/18/24 39w3d 02:23 / 00:05 3.12 kg (6 lb 14.1 oz) F Vag-Spont None N SADAF      Name: [place] [facility]      Apgar1: 9  Apgar5: 9   3 Term 19 37w6d 01:21 / 00:09 3.09 kg (6 lb 13 oz) F Vag-Spont None N SADAF      Name: ISA DE LA ROSA-PA      Apgar1: 9  Apgar5: 9   2 Term 10/02/17 40w3d 03:40 / 00:05 2.77 kg (6 lb 1.7 oz) F Vag-Spont None N SADAF      Complications: Fetal Intolerance      Name: ISA DE LA ROSA-PA      Apgar1: 9  Apgar5: 10   1 Term 16 39w5d 42:50 / 00:39 3.033 kg (6 lb 11 oz) F Vag-Spont Local, Nitrous N SADAF      Name: Noemi      Apgar1: 8  Apgar5: 9       /56 (BP Location: Left arm, Patient Position: Sitting, Cuff Size: Adult Regular)   Pulse 59   Temp 98.4  F (36.9  C) (Oral)   Resp 14   Ht 1.429 m (4' 8.25\")   Wt 56.9 kg (125 lb 6.4 oz)   LMP 2024   SpO2 100%   Breastfeeding No   BMI 27.86 kg/m     General: no apparent distress, appears well  Cardiovascular: regular rate and rhythm without murmur  Pulmonary: clear to auscultation bilaterally without wheezing or crackles  Abdomen: Soft, non-tender.  No rebound or guarding.  GYN: uterus is normal non-gravid size and nontender, well healed vaginal wall and external genitalia, multiparous normal-appearing cervix  Lower extremity: no significant " swelling    Assessment/Plan  1. Encounter for routine postpartum follow-up (Primary)  Overall doing well and adjusting to new baby. Post partum depression has not been a concern. Formula feeding. Desires OCP for contraception. Ok to resume normal activities without restriction.    2. Screening for cervical cancer  - HPV and Gynecologic Cytology Panel - Recommended Age 30-65 Years    3. Encounter for initial prescription of contraceptive pills  - norethindrone-ethinyl estradiol (MICROGESTIN 1.5/30) 1.5-30 MG-MCG tablet; Take 1 tablet by mouth daily.  Dispense: 84 tablet; Refill: 3      Jennifer Marie DO

## 2024-11-20 LAB
HPV HR 12 DNA CVX QL NAA+PROBE: NEGATIVE
HPV16 DNA CVX QL NAA+PROBE: NEGATIVE
HPV18 DNA CVX QL NAA+PROBE: NEGATIVE
HUMAN PAPILLOMA VIRUS FINAL DIAGNOSIS: NORMAL

## 2024-11-25 LAB
BKR AP ASSOCIATED HPV REPORT: NORMAL
BKR LAB AP GYN ADEQUACY: NORMAL
BKR LAB AP GYN INTERPRETATION: NORMAL
BKR LAB AP PREVIOUS ABNORMAL: NORMAL
PATH REPORT.COMMENTS IMP SPEC: NORMAL
PATH REPORT.COMMENTS IMP SPEC: NORMAL
PATH REPORT.RELEVANT HX SPEC: NORMAL

## 2025-02-18 ENCOUNTER — MEDICAL CORRESPONDENCE (OUTPATIENT)
Dept: HEALTH INFORMATION MANAGEMENT | Facility: CLINIC | Age: 40
End: 2025-02-18
Payer: COMMERCIAL

## 2025-07-28 LAB
ABO + RH BLD: NORMAL
BLD GP AB SCN SERPL QL: NEGATIVE
SPECIMEN EXP DATE BLD: NORMAL

## 2025-07-29 ENCOUNTER — OFFICE VISIT (OUTPATIENT)
Dept: FAMILY MEDICINE | Facility: CLINIC | Age: 40
End: 2025-07-29
Payer: COMMERCIAL

## 2025-07-29 VITALS
BODY MASS INDEX: 30.01 KG/M2 | RESPIRATION RATE: 18 BRPM | DIASTOLIC BLOOD PRESSURE: 56 MMHG | HEART RATE: 81 BPM | SYSTOLIC BLOOD PRESSURE: 108 MMHG | HEIGHT: 56 IN | TEMPERATURE: 98.2 F | WEIGHT: 133.4 LBS | OXYGEN SATURATION: 100 %

## 2025-07-29 DIAGNOSIS — O09.529 SUPERVISION OF HIGH-RISK PREGNANCY OF ELDERLY MULTIGRAVIDA: Primary | ICD-10-CM

## 2025-07-29 DIAGNOSIS — Z13.220 LIPID SCREENING: ICD-10-CM

## 2025-07-29 PROBLEM — R82.71 GBS BACTERIURIA: Status: RESOLVED | Noted: 2024-06-20 | Resolved: 2025-07-29

## 2025-07-29 LAB
ERYTHROCYTE [DISTWIDTH] IN BLOOD BY AUTOMATED COUNT: 12.5 % (ref 10–15)
HCG UR QL: POSITIVE
HCT VFR BLD AUTO: 37.3 % (ref 35–47)
HGB BLD-MCNC: 12.5 G/DL (ref 11.7–15.7)
MCH RBC QN AUTO: 30.6 PG (ref 26.5–33)
MCHC RBC AUTO-ENTMCNC: 33.5 G/DL (ref 31.5–36.5)
MCV RBC AUTO: 91 FL (ref 78–100)
PLATELET # BLD AUTO: 222 10E3/UL (ref 150–450)
RBC # BLD AUTO: 4.08 10E6/UL (ref 3.8–5.2)
RUBV IGG SERPL QL IA: 6.32 INDEX
RUBV IGG SERPL QL IA: POSITIVE
T PALLIDUM AB SER QL: NONREACTIVE
WBC # BLD AUTO: 7.9 10E3/UL (ref 4–11)

## 2025-07-29 PROCEDURE — 86780 TREPONEMA PALLIDUM: CPT | Performed by: FAMILY MEDICINE

## 2025-07-29 PROCEDURE — 86901 BLOOD TYPING SEROLOGIC RH(D): CPT | Performed by: FAMILY MEDICINE

## 2025-07-29 PROCEDURE — 36415 COLL VENOUS BLD VENIPUNCTURE: CPT | Performed by: FAMILY MEDICINE

## 2025-07-29 PROCEDURE — 87086 URINE CULTURE/COLONY COUNT: CPT | Performed by: FAMILY MEDICINE

## 2025-07-29 PROCEDURE — 81025 URINE PREGNANCY TEST: CPT | Performed by: FAMILY MEDICINE

## 2025-07-29 PROCEDURE — 99213 OFFICE O/P EST LOW 20 MIN: CPT | Performed by: FAMILY MEDICINE

## 2025-07-29 PROCEDURE — 85027 COMPLETE CBC AUTOMATED: CPT | Performed by: FAMILY MEDICINE

## 2025-07-29 PROCEDURE — G2211 COMPLEX E/M VISIT ADD ON: HCPCS | Performed by: FAMILY MEDICINE

## 2025-07-29 PROCEDURE — 80061 LIPID PANEL: CPT | Performed by: FAMILY MEDICINE

## 2025-07-29 PROCEDURE — 86900 BLOOD TYPING SEROLOGIC ABO: CPT | Performed by: FAMILY MEDICINE

## 2025-07-29 PROCEDURE — 86850 RBC ANTIBODY SCREEN: CPT | Performed by: FAMILY MEDICINE

## 2025-07-29 PROCEDURE — 86762 RUBELLA ANTIBODY: CPT | Performed by: FAMILY MEDICINE

## 2025-07-29 PROCEDURE — 87340 HEPATITIS B SURFACE AG IA: CPT | Performed by: FAMILY MEDICINE

## 2025-07-29 PROCEDURE — 87389 HIV-1 AG W/HIV-1&-2 AB AG IA: CPT | Performed by: FAMILY MEDICINE

## 2025-07-29 NOTE — PROGRESS NOTES
"  SUBJECTIVE:     HPI:    This is a 40 year old female patient,  who presents for her first obstetrical visit.    11w 4d by LMP  MELISSA 2026   Her cycles are regular.  Her last menstrual period was normal.   Since her LMP, she has had no complaints).   She denies nausea, emesis, and vaginal bleeding.  Have you travelled during the pregnancy?No  Have your sexual partner(s) travelled during the pregnancy?No      HISTORY:   Planned Pregnancy: Yes  Marital Status: Single  Occupation: cleaning  Living in Household: Spouse and Children    Past History:  Her past medical history   Past Medical History:   Diagnosis Date     (normal spontaneous vaginal delivery)      (normal spontaneous vaginal delivery)     Spontaneous vaginal delivery     Third-stage postpartum hemorrhage    .      She has a history of  PPH    Since her last LMP she denies use of alcohol, tobacco and street drugs.    Past medical, surgical, social and family history were reviewed and updated in EPIC.      OBJECTIVE:     EXAM:  /56   Pulse 81   Temp 98.2  F (36.8  C) (Oral)   Resp 18   Ht 1.429 m (4' 8.25\")   Wt 60.5 kg (133 lb 6.4 oz)   LMP 2025 (Approximate)   SpO2 100%   BMI 29.64 kg/m   Body mass index is 29.64 kg/m .    GENERAL: alert and no distress  RESP: lungs clear to auscultation - no rales, rhonchi or wheezes  CV: regular rate and rhythm, normal S1 S2, no S3 or S4, no murmur, click or rub, no peripheral edema  ABDOMEN: soft, nontender  : fetal heart rate 170  PSYCH: mentation appears normal, affect normal/bright    ASSESSMENT/PLAN:       ICD-10-CM    1. Supervision of high-risk pregnancy of elderly multigravida  O09.529 US OB < 14 Weeks Single     ABO/Rh type and screen     Hepatitis B surface antigen     CBC with platelets     HIV Antigen Antibody Combo     Rubella Antibody IgG     Treponema Abs w Reflex to RPR and Titer     Urine Culture Aerobic Bacterial     Myriad Non-Invasive Prenatal Screening-Prequel "      2. Lipid screening  Z13.220 Lipid Profile (Chol, Trig, HDL, LDL calc)          40 year old AMA , 11 weeks 4 days with MELISSA of 2026 based on LMP.  Initial OB labs today with NIPT screen and gender.  Ultrasound ordered to confirm due date.  Continue prenatal vitamin.    Counseling given:   - Follow up in 4 weeks for return OB visit.  - Recommended weight gain for pregnancy: 15-25 lbs.     Jennifer Marie DO

## 2025-07-29 NOTE — PATIENT INSTRUCTIONS
[facility] Information  If you have any further concerns or wish to schedule another appointment, please call our office at [phone].  Call your doctor if you experience any bleeding or abdominal cramping early in pregnancy.     For uncomplicated pregnancies, you will be seeing your doctor once a month until you are 28 weeks, then you will see your doctor twice a month. You will begin weekly visits at 36 weeks until you deliver.     If you have a medical emergency, please call [phone].    [facility]: [phone]  [facility]: [phone]  Tips to Relieve Nausea  Although nausea can occur at any time of the day, it may be worse in the morning. To help prevent nausea:  Eat small, light meals at frequent intervals.  Get up slowly. Eat a few unsalted crackers before you get out of bed.  Drink water or 7-up to soothe your stomach.  Eat an ice pop in your favorite flavor.  Ask your health care provider about taking sen or vitamin B6 for nausea and vomiting.  Talk with your health care provider if you take vitamins that upset your stomach.  Start Healthy Habits Now  What matters most is protecting your baby from this moment on. If you smoke, drink alcohol, or use drugs, now is the time to stop. If you need help, talk with your health care provider.  Smoking increases the risk of miscarriage or having a low-birth-weight baby. If you smoke, quit now.  Alcohol and drugs have been linked with miscarriage, birth defects, intellectual disability, and low birth weight. Do not drink alcohol or take drugs.  Continue your current exercise routine, or start one with walking, swimming, and other low impact exercises. Yoga specifically designed for pregnant moms is a great stress and pain reliever. Keep your self well hydrated and avoid overheating with all activity. If bleeding, fluid leakage, or cramping/contractions occur, stop the exercise and call your doctor.   Wear your  seatbelt every time you are in the car. Fasten the lap part underneath your growing belly and the shoulder part as you normally would.   Weight Gain  Add an additional 300 to 400 calories a day into your diet.  Ideal weight gain is 25 to 35 pounds.  If your BMI is over 30, your ideal weight gain is 15 pounds.  If your BMI is under 20, your ideal weight gain is 40 pounds.  Talk to your doctor if you are concerned about weight gain.   Keep Your Environment Save  You can still clean house and use scented products. Just take some simple precautions:  Wear gloves when using cleaning fluids.  Open windows to let in fresh air. Use a fan if you paint.  Avoid secondhand smoke.  Don t breathe fumes from nail polish, hair spray, cleansers, or other chemicals.  Work Concerns  The end of the first trimester is a good time to discuss working during pregnancy with your employer. Follow your health care provider s advice if your job requires you to stand for a long time, work with hazardous tools, or even sit at a desk all day. Your workspace, workload, or scheduled hours may need to be adjusted - perhaps you can change body postures more often or take an extra break.  Intimacy  Unless your health care provider tells you to, there is no reason to stop having sex while you re pregnant. You or your partner may notice changes in desire. Desire may be less in the first trimester, due to nausea and fatigue. In the second trimester, sex may be very enjoyable. The third trimester can be a challenge comfort-wise. Try different positions and see what s best for you both. As always, let your doctor know if you experience any bleeding, leakage of fluids, or cramping/contractions.  Other Pregnancy Concerns  Limit the amount of radiation you are exposed to. Always tell the radiology technologist that you are pregnant if having an xray or CT scan done.   Practice good hand washing to prevent infection.  Avoid cat litter.   Wash all fruits and  vegetabes. Always cook meat thoroughly and do not eat raw fish. Do not eat more than 6 to 12 ounces of other fish sources.   Do not eat soft cheeses.  Limit caffeine to less than 300 milligrams a days. The average cup of coffee as approximately 120 milligrams of caffeine.      Nonprescription Medications Thought To Be Safe In Pregnancy (take medication according to package directions)    NAUSEA AND MOTION SICKNESS   Vitamin C 500 mg, once a day with food   Vitamin B6 50 mg, one three times a day   Unisom tablets (not gel tabs) - 1/2 to 1 tab at bedtime; may also take 1/2 tab in the morning and mid-afternoon   Dramamine   Sea Bands   Teresa tablets    CONGESTION AND COLDS   Robitussin   Chlortrimeton   Benadryl   Vicks Vapor Rub   Cough Drops   Mucinex    ALLERGIES   Alavert   Claritin   Tavist   Benadryl   Zyrtec    HEADACHES   Tylenol 325 mg 2-3 four times a day   Tylenol 500 mg 1-2 four times a day   DO NOT EXCEED 4,000 MG A DAY  DO NOT TAKE IBUPROFEN, MOTRIN, ADVIL, ASPIRIN, OR ALEVE UNLESS ADVISED BY A PROVIDER     HEMORRHOIDS   Preparation-H   Anusol   Anusol HC    HEARTBURN   Tums   Zantac   Maalox (tablets or liquid)   Rolaids   Mylanta   Gaviscon   Pepcid AC  NO PEPTOBISMOL (contains aspirin) NO ALKASELTZER (contains aspirin)     DIARRHEA (do not treat for the first 24-48 hrs)   Kaopectate   Imodium AD    CONSTIPATION   Colace (Docusate Sodium) - stool softener   Alexandrea-Colace (Colace + mild stimulant)   Any fiber supplement (Metamucil, Fibercon ,etc.)    GAS   Gas-X   Mylanta II with Simethicone   Mylicon    INSECT BITES   Lotions: Calamine, Caladryl, Benedryl   Oral: Benedryl tabs 25-50mg (every 6-8hrs)   Sudafed ok after 16wks

## 2025-07-30 LAB
CHOLEST SERPL-MCNC: 175 MG/DL
FASTING STATUS PATIENT QL REPORTED: NO
HBV SURFACE AG SERPL QL IA: NONREACTIVE
HDLC SERPL-MCNC: 46 MG/DL
HIV 1+2 AB+HIV1 P24 AG SERPL QL IA: NONREACTIVE
LDLC SERPL CALC-MCNC: 93 MG/DL
NONHDLC SERPL-MCNC: 129 MG/DL
TRIGL SERPL-MCNC: 180 MG/DL

## 2025-07-31 LAB — BACTERIA UR CULT: NORMAL

## 2025-08-05 LAB — SCANNED LAB RESULT: NORMAL

## 2025-08-18 ENCOUNTER — HOSPITAL ENCOUNTER (OUTPATIENT)
Dept: ULTRASOUND IMAGING | Facility: HOSPITAL | Age: 40
Discharge: HOME | End: 2025-08-18
Attending: FAMILY MEDICINE | Admitting: FAMILY MEDICINE
Payer: COMMERCIAL

## 2025-08-18 DIAGNOSIS — O09.529 SUPERVISION OF HIGH-RISK PREGNANCY OF ELDERLY MULTIGRAVIDA: ICD-10-CM

## 2025-08-18 PROCEDURE — T1013 SIGN LANG/ORAL INTERPRETER: HCPCS | Performed by: INTERPRETER

## 2025-08-18 PROCEDURE — 76805 OB US >/= 14 WKS SNGL FETUS: CPT

## 2025-08-20 ENCOUNTER — RESULTS FOLLOW-UP (OUTPATIENT)
Dept: FAMILY MEDICINE | Facility: CLINIC | Age: 40
End: 2025-08-20
Payer: COMMERCIAL

## 2025-08-21 ENCOUNTER — APPOINTMENT (OUTPATIENT)
Dept: INTERPRETER SERVICES | Facility: CLINIC | Age: 40
End: 2025-08-21
Payer: COMMERCIAL

## 2025-08-28 ENCOUNTER — PRENATAL OFFICE VISIT (OUTPATIENT)
Dept: FAMILY MEDICINE | Facility: CLINIC | Age: 40
End: 2025-08-28
Payer: COMMERCIAL

## 2025-08-28 VITALS
RESPIRATION RATE: 16 BRPM | HEART RATE: 79 BPM | SYSTOLIC BLOOD PRESSURE: 95 MMHG | DIASTOLIC BLOOD PRESSURE: 60 MMHG | BODY MASS INDEX: 29.69 KG/M2 | WEIGHT: 133.6 LBS | TEMPERATURE: 98 F | OXYGEN SATURATION: 97 %

## 2025-08-28 DIAGNOSIS — O09.529 SUPERVISION OF HIGH-RISK PREGNANCY OF ELDERLY MULTIGRAVIDA: Primary | ICD-10-CM

## 2025-08-28 DIAGNOSIS — Z11.3 SCREEN FOR STD (SEXUALLY TRANSMITTED DISEASE): ICD-10-CM

## 2025-08-28 RX ORDER — PRENATAL VIT/IRON FUM/FOLIC AC 27MG-0.8MG
1 TABLET ORAL DAILY
Qty: 90 TABLET | Refills: 3 | Status: SHIPPED | OUTPATIENT
Start: 2025-08-28

## 2025-09-02 DIAGNOSIS — O09.529 AMA (ADVANCED MATERNAL AGE) MULTIGRAVIDA 35+: Primary | ICD-10-CM
